# Patient Record
Sex: FEMALE | Race: WHITE | NOT HISPANIC OR LATINO
[De-identification: names, ages, dates, MRNs, and addresses within clinical notes are randomized per-mention and may not be internally consistent; named-entity substitution may affect disease eponyms.]

---

## 2016-12-15 RX ORDER — SODIUM CHLORIDE 9 MG/ML
3 INJECTION INTRAMUSCULAR; INTRAVENOUS; SUBCUTANEOUS EVERY 8 HOURS
Qty: 0 | Refills: 0 | Status: DISCONTINUED | OUTPATIENT
Start: 2017-01-04 | End: 2017-01-04

## 2016-12-20 RX ORDER — GABAPENTIN 400 MG/1
300 CAPSULE ORAL ONCE
Qty: 0 | Refills: 0 | Status: COMPLETED | OUTPATIENT
Start: 2017-01-04 | End: 2017-01-04

## 2016-12-20 RX ORDER — TRANEXAMIC ACID 100 MG/ML
600 INJECTION, SOLUTION INTRAVENOUS ONCE
Qty: 0 | Refills: 0 | Status: DISCONTINUED | OUTPATIENT
Start: 2017-01-04 | End: 2017-01-04

## 2016-12-20 RX ORDER — CELECOXIB 200 MG/1
400 CAPSULE ORAL ONCE
Qty: 0 | Refills: 0 | Status: COMPLETED | OUTPATIENT
Start: 2017-01-04 | End: 2017-01-04

## 2016-12-20 RX ORDER — ACETAMINOPHEN 500 MG
1000 TABLET ORAL ONCE
Qty: 0 | Refills: 0 | Status: DISCONTINUED | OUTPATIENT
Start: 2017-01-04 | End: 2017-01-04

## 2016-12-20 RX ORDER — SCOPALAMINE 1 MG/3D
1.5 PATCH, EXTENDED RELEASE TRANSDERMAL ONCE
Qty: 0 | Refills: 0 | Status: COMPLETED | OUTPATIENT
Start: 2017-01-04 | End: 2017-01-04

## 2016-12-20 RX ORDER — CEFAZOLIN SODIUM 1 G
2000 VIAL (EA) INJECTION ONCE
Qty: 0 | Refills: 0 | Status: DISCONTINUED | OUTPATIENT
Start: 2017-01-04 | End: 2017-01-04

## 2016-12-20 RX ORDER — OXYCODONE HYDROCHLORIDE 5 MG/1
10 TABLET ORAL ONCE
Qty: 0 | Refills: 0 | Status: DISCONTINUED | OUTPATIENT
Start: 2017-01-04 | End: 2017-01-04

## 2017-01-03 ENCOUNTER — RESULT REVIEW (OUTPATIENT)
Age: 82
End: 2017-01-03

## 2017-01-04 ENCOUNTER — TRANSCRIPTION ENCOUNTER (OUTPATIENT)
Age: 82
End: 2017-01-04

## 2017-01-04 ENCOUNTER — INPATIENT (INPATIENT)
Facility: HOSPITAL | Age: 82
LOS: 0 days | Discharge: HOME | DRG: 470 | End: 2017-01-05
Attending: ORTHOPAEDIC SURGERY | Admitting: ORTHOPAEDIC SURGERY
Payer: MEDICARE

## 2017-01-04 ENCOUNTER — APPOINTMENT (OUTPATIENT)
Dept: ORTHOPEDIC SURGERY | Facility: HOSPITAL | Age: 82
End: 2017-01-04

## 2017-01-04 VITALS
SYSTOLIC BLOOD PRESSURE: 145 MMHG | WEIGHT: 136.69 LBS | RESPIRATION RATE: 16 BRPM | HEIGHT: 66 IN | OXYGEN SATURATION: 99 % | HEART RATE: 57 BPM | DIASTOLIC BLOOD PRESSURE: 53 MMHG | TEMPERATURE: 98 F

## 2017-01-04 DIAGNOSIS — G47.30 SLEEP APNEA, UNSPECIFIED: ICD-10-CM

## 2017-01-04 DIAGNOSIS — Z98.49 CATARACT EXTRACTION STATUS, UNSPECIFIED EYE: Chronic | ICD-10-CM

## 2017-01-04 DIAGNOSIS — Z96.652 PRESENCE OF LEFT ARTIFICIAL KNEE JOINT: ICD-10-CM

## 2017-01-04 DIAGNOSIS — M17.12 UNILATERAL PRIMARY OSTEOARTHRITIS, LEFT KNEE: ICD-10-CM

## 2017-01-04 DIAGNOSIS — R00.1 BRADYCARDIA, UNSPECIFIED: ICD-10-CM

## 2017-01-04 PROCEDURE — 88311 DECALCIFY TISSUE: CPT | Mod: 26

## 2017-01-04 PROCEDURE — 27447 TOTAL KNEE ARTHROPLASTY: CPT | Mod: AS,LT

## 2017-01-04 PROCEDURE — 73560 X-RAY EXAM OF KNEE 1 OR 2: CPT | Mod: 26,LT

## 2017-01-04 PROCEDURE — 99223 1ST HOSP IP/OBS HIGH 75: CPT

## 2017-01-04 PROCEDURE — 27447 TOTAL KNEE ARTHROPLASTY: CPT | Mod: LT

## 2017-01-04 PROCEDURE — 88305 TISSUE EXAM BY PATHOLOGIST: CPT | Mod: 26

## 2017-01-04 RX ORDER — DOCUSATE SODIUM 100 MG
100 CAPSULE ORAL THREE TIMES A DAY
Qty: 0 | Refills: 0 | Status: DISCONTINUED | OUTPATIENT
Start: 2017-01-04 | End: 2017-01-05

## 2017-01-04 RX ORDER — AMLODIPINE BESYLATE 2.5 MG/1
5 TABLET ORAL DAILY
Qty: 0 | Refills: 0 | Status: DISCONTINUED | OUTPATIENT
Start: 2017-01-05 | End: 2017-01-05

## 2017-01-04 RX ORDER — HYDROMORPHONE HYDROCHLORIDE 2 MG/ML
2 INJECTION INTRAMUSCULAR; INTRAVENOUS; SUBCUTANEOUS
Qty: 0 | Refills: 0 | Status: DISCONTINUED | OUTPATIENT
Start: 2017-01-04 | End: 2017-01-05

## 2017-01-04 RX ORDER — ONDANSETRON 8 MG/1
4 TABLET, FILM COATED ORAL EVERY 4 HOURS
Qty: 0 | Refills: 0 | Status: DISCONTINUED | OUTPATIENT
Start: 2017-01-04 | End: 2017-01-05

## 2017-01-04 RX ORDER — ACETAMINOPHEN 500 MG
1000 TABLET ORAL
Qty: 0 | Refills: 0 | Status: COMPLETED | OUTPATIENT
Start: 2017-01-04 | End: 2017-01-04

## 2017-01-04 RX ORDER — SODIUM CHLORIDE 9 MG/ML
1000 INJECTION INTRAMUSCULAR; INTRAVENOUS; SUBCUTANEOUS
Qty: 0 | Refills: 0 | Status: DISCONTINUED | OUTPATIENT
Start: 2017-01-04 | End: 2017-01-05

## 2017-01-04 RX ORDER — SODIUM CHLORIDE 9 MG/ML
1000 INJECTION, SOLUTION INTRAVENOUS
Qty: 0 | Refills: 0 | Status: DISCONTINUED | OUTPATIENT
Start: 2017-01-04 | End: 2017-01-04

## 2017-01-04 RX ORDER — ASPIRIN/CALCIUM CARB/MAGNESIUM 324 MG
325 TABLET ORAL
Qty: 0 | Refills: 0 | Status: DISCONTINUED | OUTPATIENT
Start: 2017-01-05 | End: 2017-01-05

## 2017-01-04 RX ORDER — OXYCODONE HYDROCHLORIDE 5 MG/1
10 TABLET ORAL EVERY 12 HOURS
Qty: 0 | Refills: 0 | Status: DISCONTINUED | OUTPATIENT
Start: 2017-01-04 | End: 2017-01-05

## 2017-01-04 RX ORDER — ONDANSETRON 8 MG/1
4 TABLET, FILM COATED ORAL ONCE
Qty: 0 | Refills: 0 | Status: DISCONTINUED | OUTPATIENT
Start: 2017-01-04 | End: 2017-01-04

## 2017-01-04 RX ORDER — ATENOLOL 25 MG/1
25 TABLET ORAL DAILY
Qty: 0 | Refills: 0 | Status: DISCONTINUED | OUTPATIENT
Start: 2017-01-05 | End: 2017-01-05

## 2017-01-04 RX ORDER — VANCOMYCIN HCL 1 G
1000 VIAL (EA) INTRAVENOUS ONCE
Qty: 0 | Refills: 0 | Status: COMPLETED | OUTPATIENT
Start: 2017-01-04 | End: 2017-01-04

## 2017-01-04 RX ORDER — VANCOMYCIN HCL 1 G
1000 VIAL (EA) INTRAVENOUS
Qty: 0 | Refills: 0 | Status: COMPLETED | OUTPATIENT
Start: 2017-01-04 | End: 2017-01-04

## 2017-01-04 RX ORDER — HYDROMORPHONE HYDROCHLORIDE 2 MG/ML
0.5 INJECTION INTRAMUSCULAR; INTRAVENOUS; SUBCUTANEOUS
Qty: 0 | Refills: 0 | Status: DISCONTINUED | OUTPATIENT
Start: 2017-01-04 | End: 2017-01-05

## 2017-01-04 RX ORDER — KETOROLAC TROMETHAMINE 30 MG/ML
15 SYRINGE (ML) INJECTION EVERY 6 HOURS
Qty: 0 | Refills: 0 | Status: DISCONTINUED | OUTPATIENT
Start: 2017-01-04 | End: 2017-01-05

## 2017-01-04 RX ORDER — SENNA PLUS 8.6 MG/1
2 TABLET ORAL AT BEDTIME
Qty: 0 | Refills: 0 | Status: DISCONTINUED | OUTPATIENT
Start: 2017-01-04 | End: 2017-01-05

## 2017-01-04 RX ORDER — CEFAZOLIN SODIUM 1 G
2000 VIAL (EA) INJECTION
Qty: 0 | Refills: 0 | Status: COMPLETED | OUTPATIENT
Start: 2017-01-04 | End: 2017-01-04

## 2017-01-04 RX ORDER — FENTANYL CITRATE 50 UG/ML
25 INJECTION INTRAVENOUS
Qty: 0 | Refills: 0 | Status: DISCONTINUED | OUTPATIENT
Start: 2017-01-04 | End: 2017-01-04

## 2017-01-04 RX ORDER — ACETAMINOPHEN 500 MG
650 TABLET ORAL EVERY 6 HOURS
Qty: 0 | Refills: 0 | Status: DISCONTINUED | OUTPATIENT
Start: 2017-01-04 | End: 2017-01-05

## 2017-01-04 RX ORDER — MAGNESIUM HYDROXIDE 400 MG/1
30 TABLET, CHEWABLE ORAL DAILY
Qty: 0 | Refills: 0 | Status: DISCONTINUED | OUTPATIENT
Start: 2017-01-04 | End: 2017-01-05

## 2017-01-04 RX ORDER — OXYCODONE HYDROCHLORIDE 5 MG/1
5 TABLET ORAL
Qty: 0 | Refills: 0 | Status: DISCONTINUED | OUTPATIENT
Start: 2017-01-04 | End: 2017-01-05

## 2017-01-04 RX ORDER — OXYCODONE HYDROCHLORIDE 5 MG/1
10 TABLET ORAL
Qty: 0 | Refills: 0 | Status: DISCONTINUED | OUTPATIENT
Start: 2017-01-04 | End: 2017-01-05

## 2017-01-04 RX ADMIN — Medication 250 MILLIGRAM(S): at 09:25

## 2017-01-04 RX ADMIN — SCOPALAMINE 1.5 MILLIGRAM(S): 1 PATCH, EXTENDED RELEASE TRANSDERMAL at 07:43

## 2017-01-04 RX ADMIN — Medication 100 MILLIGRAM(S): at 17:00

## 2017-01-04 RX ADMIN — Medication 1000 MILLIGRAM(S): at 21:09

## 2017-01-04 RX ADMIN — Medication 250 MILLIGRAM(S): at 22:11

## 2017-01-04 RX ADMIN — CELECOXIB 400 MILLIGRAM(S): 200 CAPSULE ORAL at 07:42

## 2017-01-04 RX ADMIN — OXYCODONE HYDROCHLORIDE 10 MILLIGRAM(S): 5 TABLET ORAL at 07:42

## 2017-01-04 RX ADMIN — Medication 100 MILLIGRAM(S): at 22:11

## 2017-01-04 RX ADMIN — GABAPENTIN 300 MILLIGRAM(S): 400 CAPSULE ORAL at 07:43

## 2017-01-04 RX ADMIN — Medication 400 MILLIGRAM(S): at 20:18

## 2017-01-04 NOTE — PROGRESS NOTE ADULT - SUBJECTIVE AND OBJECTIVE BOX
Ortho Post Op Check    Name: VERÓNICA STEVENSON    MR #: [id number]    Procedure: Left Total Knee Arthroplasty  Surgeon: Kike Bates    Pt comfortable without complaints, pain controlled, Found laying in bed with friend at bedside  Denies CP, SOB, N/V, numbness/tingling     General Exam:  Vital Signs Last 24 Hrs  T(C): 36.4, Max: 36.4 (01-04 @ 18:00)  T(F): 97.5, Max: 97.5 (01-04 @ 18:00)  HR: 62 (56 - 62)  BP: 100/56 (100/56 - 115/46)  BP(mean): --  RR: 13 (13 - 20)  SpO2: 96% (96% - 97%)  Wt(kg): --    General: Pt Alert and oriented, NAD, controlled pain.  Dressings C/D/I. No bleeding.  Pulses: 2+ dorsalis pedis pulse. Cap refill < 2 sec.  Sensation: Grossly intact to light touch without deficit.  Motor: + EHL/FHL/TA/GS    Post-op X-Ray: Reviewed      A/P: 81yFemale POD#0 s/p   - Stable  - Pain Control  - DVT ppx: Aspirin 325mg Twice daily  - Post op abx: Ancef, Vancomycin  - PT continue present care  - Weight bearing status: WBAT

## 2017-01-04 NOTE — DISCHARGE NOTE ADULT - PLAN OF CARE
Decrease Pain, Improve ambulation and activities of daily living The patient will be seen in the office between 2-3 weeks for wound check. Patient may shower after post-op day #5. The dressing is to be removed on day # 10 (ten). The patient will contact the office if the wound becomes red, has increasing pain, develops bleeding or discharge, an injury occurs, or has other concerns. The patient will continue PT consistent with total knee replacement. The patient will continue Aspirin 325mg twice daily for 6 weeks for DVTP. The patient will take Oxycodone for pain control and titrate according to prescription and patient needs. The patient is FULL weight bearing. Elevation of the lower leg is recommended to reduce swelling. The patient will be seen in the office between 2-3 weeks for wound check. Patient may shower after post-op day #5. The dressing is to be removed on day # 10 (ten). The patient will contact the office if the wound becomes red, has increasing pain, develops bleeding or discharge, an injury occurs, or has other concerns. The patient will continue PT consistent with total knee replacement. The patient will continue Aspirin 325mg twice daily for 6 weeks for DVTP. The patient will take Tramadol for pain control and titrate according to prescription and patient needs. The patient is FULL weight bearing. Elevation of the lower leg is recommended to reduce swelling.

## 2017-01-04 NOTE — DISCHARGE NOTE ADULT - NS AS ACTIVITY OBS
Walking-Indoors allowed/Stairs allowed/No Heavy lifting/straining/Showering allowed/Walking-Outdoors allowed

## 2017-01-04 NOTE — CONSULT NOTE ADULT - ASSESSMENT
This is a 81 y.o female who presents to PST today.  The pt reports a history of chronic left knee pain most likely sustained from being an avid skier. S/P L TKA # 0 This is a 81 y.o female with  history of chronic left knee pain most likely sustained from being an avid skier. S/P L TKA # 0

## 2017-01-04 NOTE — CONSULT NOTE ADULT - PROBLEM SELECTOR RECOMMENDATION 2
PT/OT/pain mgmt  DVT prophylaxis- as per ortho  Abx as per SCIP  Incentive spirometry  Prophylaxis of opoid induced constipation

## 2017-01-04 NOTE — DISCHARGE NOTE ADULT - CARE PLAN
Principal Discharge DX:	Status post left knee replacement  Goal:	Decrease Pain, Improve ambulation and activities of daily living  Instructions for follow-up, activity and diet:	The patient will be seen in the office between 2-3 weeks for wound check. Patient may shower after post-op day #5. The dressing is to be removed on day # 10 (ten). The patient will contact the office if the wound becomes red, has increasing pain, develops bleeding or discharge, an injury occurs, or has other concerns. The patient will continue PT consistent with total knee replacement. The patient will continue Aspirin 325mg twice daily for 6 weeks for DVTP. The patient will take Oxycodone for pain control and titrate according to prescription and patient needs. The patient is FULL weight bearing. Elevation of the lower leg is recommended to reduce swelling. Principal Discharge DX:	Status post left knee replacement  Goal:	Decrease Pain, Improve ambulation and activities of daily living  Instructions for follow-up, activity and diet:	The patient will be seen in the office between 2-3 weeks for wound check. Patient may shower after post-op day #5. The dressing is to be removed on day # 10 (ten). The patient will contact the office if the wound becomes red, has increasing pain, develops bleeding or discharge, an injury occurs, or has other concerns. The patient will continue PT consistent with total knee replacement. The patient will continue Aspirin 325mg twice daily for 6 weeks for DVTP. The patient will take Tramadol for pain control and titrate according to prescription and patient needs. The patient is FULL weight bearing. Elevation of the lower leg is recommended to reduce swelling.

## 2017-01-04 NOTE — DISCHARGE NOTE ADULT - CARE PROVIDER_API CALL
Kike Bates), Orthopaedic Surgery  [address] [place] [id number]  Phone: [phone]  Fax: [phone]

## 2017-01-04 NOTE — DISCHARGE NOTE ADULT - HOSPITAL COURSE
The patient underwent a LEFT TOTAL KNEE REPLACEMENT on 1/4/2017. The patient received antibiotics consistent with SCIP guidelines. The patient underwent the procedure and had no intra-operative complications. Post-operatively, the patient was seen by medicine and PT. The patient received Aspirin for DVTP. The patient received pain medications per orthopedic pain management protocol and the pain was appropriately controlled. The patient did not have any post-operative medical complications. The patient was discharged in stable condition.

## 2017-01-04 NOTE — PHYSICAL THERAPY INITIAL EVALUATION ADULT - ACTIVE RANGE OF MOTION EXAMINATION, REHAB EVAL
bilateral  lower extremity Active ROM was WFL (within functional limits)/deficits as listed below/L knee flexion to 70deg./bilateral upper extremity Active ROM was WFL (within functional limits)

## 2017-01-04 NOTE — CONSULT NOTE ADULT - SUBJECTIVE AND OBJECTIVE BOX
PMD : Shant   Cardio :     Patient is a 81y old  Female s/p L TKA , POD # 0  CC: L knee pain    HPI:  This is a 81 y.o female who presents to PST today.  The pt reports a history of chronic left knee pain most likely sustained from being an avid skier. S/P L TKA # 0      PAST MEDICAL & SURGICAL HISTORY:  Borderline diabetes mellitus  Sleep apnea  H/O: hypertension  Hyperlipemia  History of cataract surgery  History of D&C      Social History:  Tabacco -   ETOH -   Illicit drug abuse - denies    FAMILY HISTORY:  Family history of hypertension (Mother)  Family history of acute myocardial infarction (Mother)  Family history of lung cancer (Father)      Allergies    codeine (Nausea; Vomiting)  Demerol HCl (Nausea; Vomiting)    HOME MEDICATIONS :     · 	atenolol 25 mg oral tablet: Last Dose Taken:  , 1 tab(s) orally once a day  · 	amLODIPine 5 mg oral tablet: Last Dose Taken:  , 1 tab(s) orally once a day  · 	eye vitamins: Last Dose Taken:  , 1 cap(s)  once a day  · 	calcium: Last Dose Taken:  , 1000 milligram(s)  once a day  · 	alph lopoic acid: Last Dose Taken:  , 300 milligram(s) orally once a day    REVIEW OF SYSTEMS:    CONSTITUTIONAL: No fever, weight loss, or fatigue  EYES: No eye pain, visual disturbances, or discharge  NECK: No pain or stiffness  RESPIRATORY: No cough, wheezing, chills or hemoptysis; No shortness of breath  CARDIOVASCULAR: No chest pain, palpitations, dizziness, or leg swelling  GASTROINTESTINAL: No abdominal or epigastric pain. No nausea, vomiting, or hematemesis; No diarrhea or constipation. No melena or hematochezia.  GENITOURINARY: No dysuria, frequency, hematuria, or incontinence  NEUROLOGICAL: No headaches, memory loss, loss of strength, numbness, or tremors  SKIN: No itching, burning, rashes, or lesions   LYMPH NODES: No enlarged glands  ENDOCRINE: No heat or cold intolerance; No hair loss  MUSCULOSKELETAL: L knee pain  PSYCHIATRIC: No depression, anxiety, mood swings, or difficulty sleeping  HEME/LYMPH: No easy bruising, or bleeding gums  ALLERGY AND IMMUNOLOGIC: No hives or eczema    MEDICATIONS  (STANDING):    MEDICATIONS  (PRN):      Vital Signs Last 24 Hrs  T(C): 36.7, Max: 36.7 (01-04 @ 07:34)  T(F): 98.1, Max: 98.1 (01-04 @ 07:34)  HR: 57 (57 - 57)  BP: 145/53 (145/53 - 145/53)  BP(mean): --  RR: 16 (16 - 16)  SpO2: 99% (99% - 99%)    PHYSICAL EXAM:    GENERAL: NAD, well-groomed, well-developed  HEAD:  Atraumatic, Normocephalic  EYES: EOMI, PERRLA, conjunctiva and sclera clear  NECK: Supple, No JVD, Normal thyroid  NERVOUS SYSTEM:  Alert & Oriented X3, Good concentration; Motor Strength 5/5 B/L upper and lower extremities; DTRs 2+ intact and symmetric  CHEST/LUNG: CTA  b/l,  no rales, rhonchi, wheezing, or rubs  HEART: Regular rate and rhythm; No murmurs, rubs, or gallops  ABDOMEN: Soft, Nontender, Nondistended; Bowel sounds present  EXTREMITIES:  2+ Peripheral Pulses, No clubbing, cyanosis, or edema ,   LYMPH: No lymphadenopathy noted  SKIN: No rashes or lesions    LABS:- pending    RADIOLOGY & ADDITIONAL STUDIES: PMD : Shant   Cardio : none    Patient is a 81y old  Female s/p L TKA , POD # 0  CC: L knee pain    HPI:  This is a 81 y.o female,  history of chronic left knee pain most likely sustained from being an avid skier. S/P L TKA # 0      PAST MEDICAL & SURGICAL HISTORY:  Borderline diabetes mellitus  Sleep apnea- not on CPAP , uses oral appliance  H/O: hypertension  Hyperlipemia  History of cataract surgery  History of D&C      Social History:  Tabacco - ex - smoker , quit > 40 yrs  ETOH - socially   Illicit drug abuse - denies    FAMILY HISTORY:  Family history of hypertension (Mother)  Family history of acute myocardial infarction (Mother)  Family history of lung cancer (Father)      Allergies    codeine (Nausea; Vomiting)  Demerol HCl (Nausea; Vomiting)    HOME MEDICATIONS :     · 	atenolol 25 mg oral tablet: Last Dose Taken:  , 1 tab(s) orally once a day  · 	amLODIPine 5 mg oral tablet: Last Dose Taken:  , 1 tab(s) orally once a day  · 	eye vitamins: Last Dose Taken:  , 1 cap(s)  once a day  · 	calcium: Last Dose Taken:  , 1000 milligram(s)  once a day  · 	alph lopoic acid: Last Dose Taken:  , 300 milligram(s) orally once a day    REVIEW OF SYSTEMS:    CONSTITUTIONAL: No fever, weight loss, or fatigue  EYES: No eye pain, visual disturbances, or discharge  NECK: No pain or stiffness  RESPIRATORY: No cough, wheezing, chills or hemoptysis; No shortness of breath  CARDIOVASCULAR: No chest pain, palpitations, dizziness, or leg swelling  GASTROINTESTINAL: No abdominal or epigastric pain. No nausea, vomiting, or hematemesis; No diarrhea or constipation. No melena or hematochezia.  GENITOURINARY: No dysuria, frequency, hematuria, or incontinence  NEUROLOGICAL: No headaches, memory loss, loss of strength, numbness, or tremors  SKIN: No itching, burning, rashes, or lesions   LYMPH NODES: No enlarged glands  ENDOCRINE: No heat or cold intolerance; No hair loss  MUSCULOSKELETAL: L knee pain  PSYCHIATRIC: No depression, anxiety, mood swings, or difficulty sleeping  HEME/LYMPH: No easy bruising, or bleeding gums  ALLERGY AND IMMUNOLOGIC: No hives or eczema    MEDICATIONS  (STANDING):    MEDICATIONS  (PRN):      Vital Signs Last 24 Hrs  T(C): 36.7, Max: 36.7 (01-04 @ 07:34)  T(F): 98.1, Max: 98.1 (01-04 @ 07:34)  HR: 57 (57 - 57)  BP: 145/53 (145/53 - 145/53)  BP(mean): --  RR: 16 (16 - 16)  SpO2: 99% (99% - 99%)    PHYSICAL EXAM:    GENERAL: NAD, well-groomed, well-developed  HEAD:  Atraumatic, Normocephalic  EYES: EOMI, PERRLA, conjunctiva and sclera clear  NECK: Supple, No JVD, Normal thyroid  NERVOUS SYSTEM:  Alert & Oriented X3, Good concentration;  CHEST/LUNG: CTA  b/l,  no rales, rhonchi, wheezing, or rubs  HEART: Regular rate , bradycardic; No murmurs, rubs, or gallops  ABDOMEN: Soft, Nontender, Nondistended; Bowel sounds present  EXTREMITIES:  2+ Peripheral Pulses, No clubbing, cyanosis, or edema , L knee dressing + , C/D/I  LYMPH: No lymphadenopathy noted  SKIN: No rashes or lesions    LABS:- pending    RADIOLOGY & ADDITIONAL STUDIES:     ekg ( 12/15/16 )    iagnosis Line Sinus bradycardia  DOtherwise normal ECG - reviewed by me

## 2017-01-04 NOTE — DISCHARGE NOTE ADULT - NS MD DC FALL RISK RISK
For information on Fall & Injury Prevention, visit [email or web address]

## 2017-01-04 NOTE — DISCHARGE NOTE ADULT - MEDICATION SUMMARY - MEDICATIONS TO TAKE
I will START or STAY ON the medications listed below when I get home from the hospital:    eye vitamins  -- 1 cap(s)  once a day  -- Indication: For Home med    calcium  -- 1000 milligram(s)  once a day  -- Indication: For Home med    alph lopoic acid  -- 300 milligram(s) by mouth once a day  -- Indication: For Home med    aspirin 325 mg oral tablet  -- 1 tab(s) by mouth 2 times a day x 6 weeks  -- Indication: For DVTP    traMADol 50 mg oral tablet  -- 1 tab(s) by mouth every 4 to 6 hours, As needed MDD:6  -- Indication: For Pain    atenolol 25 mg oral tablet  -- 1 tab(s) by mouth once a day  -- Indication: For Home med    amLODIPine 5 mg oral tablet  -- 1 tab(s) by mouth once a day  -- Indication: For Home med    docusate sodium 100 mg oral capsule  -- 1 cap(s) by mouth 3 times a day  -- Indication: For constipation

## 2017-01-04 NOTE — DISCHARGE NOTE ADULT - CARE PROVIDERS DIRECT ADDRESSES
[email or web address]

## 2017-01-04 NOTE — DISCHARGE NOTE ADULT - PATIENT PORTAL LINK FT
“You can access the Follow[facility] Patient Portal, offered by [facility], by registering with the following website: [email or web address]”

## 2017-01-04 NOTE — PHYSICAL THERAPY INITIAL EVALUATION ADULT - ADDITIONAL COMMENTS
Pt lives in a co-op on ground level, no stairs. Her sister is here to stay with her for as long as she needs so she will not be alone. Pt owns a RW but did not use it.

## 2017-01-05 VITALS
RESPIRATION RATE: 15 BRPM | DIASTOLIC BLOOD PRESSURE: 52 MMHG | SYSTOLIC BLOOD PRESSURE: 107 MMHG | HEART RATE: 54 BPM | OXYGEN SATURATION: 97 % | TEMPERATURE: 98 F

## 2017-01-05 LAB
ANION GAP SERPL CALC-SCNC: 9 MMOL/L — SIGNIFICANT CHANGE UP (ref 5–17)
BUN SERPL-MCNC: 14 MG/DL — SIGNIFICANT CHANGE UP (ref 8–20)
CALCIUM SERPL-MCNC: 8.5 MG/DL — LOW (ref 8.6–10.2)
CHLORIDE SERPL-SCNC: 106 MMOL/L — SIGNIFICANT CHANGE UP (ref 98–107)
CO2 SERPL-SCNC: 27 MMOL/L — SIGNIFICANT CHANGE UP (ref 22–29)
CREAT SERPL-MCNC: 0.51 MG/DL — SIGNIFICANT CHANGE UP (ref 0.5–1.3)
GLUCOSE SERPL-MCNC: 94 MG/DL — SIGNIFICANT CHANGE UP (ref 70–115)
HCT VFR BLD CALC: 36.3 % — LOW (ref 37–47)
HGB BLD-MCNC: 11.9 G/DL — LOW (ref 12–16)
MCHC RBC-ENTMCNC: 30.2 PG — SIGNIFICANT CHANGE UP (ref 27–31)
MCHC RBC-ENTMCNC: 32.8 G/DL — SIGNIFICANT CHANGE UP (ref 32–36)
MCV RBC AUTO: 92.1 FL — SIGNIFICANT CHANGE UP (ref 81–99)
PLATELET # BLD AUTO: 191 K/UL — SIGNIFICANT CHANGE UP (ref 150–400)
POTASSIUM SERPL-MCNC: 4 MMOL/L — SIGNIFICANT CHANGE UP (ref 3.5–5.3)
POTASSIUM SERPL-SCNC: 4 MMOL/L — SIGNIFICANT CHANGE UP (ref 3.5–5.3)
RBC # BLD: 3.94 M/UL — LOW (ref 4.4–5.2)
RBC # FLD: 12.7 % — SIGNIFICANT CHANGE UP (ref 11–15.6)
SODIUM SERPL-SCNC: 142 MMOL/L — SIGNIFICANT CHANGE UP (ref 135–145)
WBC # BLD: 7.49 K/UL — SIGNIFICANT CHANGE UP (ref 4.8–10.8)
WBC # FLD AUTO: 7.49 K/UL — SIGNIFICANT CHANGE UP (ref 4.8–10.8)

## 2017-01-05 PROCEDURE — 97116 GAIT TRAINING THERAPY: CPT

## 2017-01-05 PROCEDURE — 80048 BASIC METABOLIC PNL TOTAL CA: CPT

## 2017-01-05 PROCEDURE — 97167 OT EVAL HIGH COMPLEX 60 MIN: CPT

## 2017-01-05 PROCEDURE — 97110 THERAPEUTIC EXERCISES: CPT

## 2017-01-05 PROCEDURE — 36415 COLL VENOUS BLD VENIPUNCTURE: CPT

## 2017-01-05 PROCEDURE — 99232 SBSQ HOSP IP/OBS MODERATE 35: CPT

## 2017-01-05 PROCEDURE — 88311 DECALCIFY TISSUE: CPT

## 2017-01-05 PROCEDURE — 88305 TISSUE EXAM BY PATHOLOGIST: CPT

## 2017-01-05 PROCEDURE — 85027 COMPLETE CBC AUTOMATED: CPT

## 2017-01-05 PROCEDURE — 97530 THERAPEUTIC ACTIVITIES: CPT

## 2017-01-05 PROCEDURE — C1776: CPT

## 2017-01-05 PROCEDURE — C1713: CPT

## 2017-01-05 PROCEDURE — 73560 X-RAY EXAM OF KNEE 1 OR 2: CPT

## 2017-01-05 PROCEDURE — 97163 PT EVAL HIGH COMPLEX 45 MIN: CPT

## 2017-01-05 RX ORDER — TRAMADOL HYDROCHLORIDE 50 MG/1
50 TABLET ORAL EVERY 4 HOURS
Qty: 0 | Refills: 0 | Status: DISCONTINUED | OUTPATIENT
Start: 2017-01-05 | End: 2017-01-05

## 2017-01-05 RX ORDER — TRAMADOL HYDROCHLORIDE 50 MG/1
1 TABLET ORAL
Qty: 42 | Refills: 0
Start: 2017-01-05

## 2017-01-05 RX ORDER — TRAMADOL HYDROCHLORIDE 50 MG/1
1 TABLET ORAL
Qty: 0 | Refills: 0 | COMMUNITY
Start: 2017-01-05

## 2017-01-05 RX ORDER — DOCUSATE SODIUM 100 MG
1 CAPSULE ORAL
Qty: 30 | Refills: 0
Start: 2017-01-05

## 2017-01-05 RX ORDER — ASPIRIN/CALCIUM CARB/MAGNESIUM 324 MG
1 TABLET ORAL
Qty: 90 | Refills: 0
Start: 2017-01-05

## 2017-01-05 RX ADMIN — Medication 15 MILLIGRAM(S): at 14:30

## 2017-01-05 RX ADMIN — Medication 15 MILLIGRAM(S): at 06:22

## 2017-01-05 RX ADMIN — Medication 325 MILLIGRAM(S): at 06:22

## 2017-01-05 RX ADMIN — Medication 650 MILLIGRAM(S): at 06:22

## 2017-01-05 RX ADMIN — Medication 15 MILLIGRAM(S): at 07:07

## 2017-01-05 RX ADMIN — Medication 100 MILLIGRAM(S): at 06:22

## 2017-01-05 RX ADMIN — HYDROMORPHONE HYDROCHLORIDE 2 MILLIGRAM(S): 2 INJECTION INTRAMUSCULAR; INTRAVENOUS; SUBCUTANEOUS at 11:56

## 2017-01-05 RX ADMIN — Medication 15 MILLIGRAM(S): at 13:41

## 2017-01-05 RX ADMIN — ATENOLOL 25 MILLIGRAM(S): 25 TABLET ORAL at 06:22

## 2017-01-05 RX ADMIN — Medication 650 MILLIGRAM(S): at 13:41

## 2017-01-05 RX ADMIN — Medication 100 MILLIGRAM(S): at 13:43

## 2017-01-05 RX ADMIN — HYDROMORPHONE HYDROCHLORIDE 2 MILLIGRAM(S): 2 INJECTION INTRAMUSCULAR; INTRAVENOUS; SUBCUTANEOUS at 12:30

## 2017-01-05 NOTE — PROGRESS NOTE ADULT - SUBJECTIVE AND OBJECTIVE BOX
Patient seen and examined at bedside. Comfortable in bed. Pain controlled. Admits to participating with PT yesterday, states feels ready to go home today. Denies SOB/chest pain, abdominal pain, numbness/tingling. no complaints.    Vital Signs Last 24 Hrs  T(C): 36.6, Max: 37 (01-04 @ 12:37)  T(F): 97.8, Max: 98.6 (01-04 @ 12:37)  HR: 58 (46 - 64)  BP: 104/50 (90/45 - 145/53)  BP(mean): --  RR: 16 (11 - 20)  SpO2: 97% (94% - 100%)    LLE: Dressing C/D/I. Stockings noted, in place B/L. sensation in tact to light touch distally. DP 2+. +dorsi/plantarflexion. Able to wiggle toes. Calf soft, NT B/L                          11.9   7.49  )-----------( 191      ( 05 Jan 2017 05:14 )             36.3   05 Jan 2017 05:14    142    |  106    |  14.0   ----------------------------<  94     4.0     |  27.0   |  0.51     Ca    8.5        05 Jan 2017 05:14    A/P: 81 y.o F s/p left TKA POD #1  - WBAT  - DVTP (ASA 325mg BID x 6 weeks)  - pain control  - D/C planning - home today if cleared by PT/medicine

## 2017-01-05 NOTE — PROGRESS NOTE ADULT - ASSESSMENT
This is a 81 y.o female with  history of chronic left knee pain most likely sustained from being an avid skier. S/P L TKA # 1    Problem/Recommendation - 1:  Problem: Unilateral primary osteoarthritis, left knee. Recommendation: s/p L TKA.    Problem/Recommendation - 2:  ·  Problem: Status post left knee replacement.  Recommendation: PT/OT/pain mgmt  DVT prophylaxis- as per ortho  Abx as per SCIP  Incentive spirometry  Prophylaxis of opoid induced constipation.     Problem/Recommendation - 3:  ·  Problem: Borderline diabetes mellitus.  Recommendation: follow up with PMD.     Problem/Recommendation - 4:  ·  Problem: H/O: hypertension.  Recommendation: continue home meds with parameters.     Problem/Recommendation - 5:  ·  Problem: Sleep apnea, unspecified type.  Recommendation: not on CPAP , uses oral appliance , may use own , nocturnal .     Problem/Recommendation - 6:  Problem: Sinus bradycardia by electrocardiogram. Recommendation: Patient on BB , recent EKG with keila , asymptomatic , hold Atenolol if Hr < 55. This is a 81 y.o female with  history of chronic left knee pain most likely sustained from being an avid skier. S/P L TKA # 1    Problem/Recommendation - 1:  Problem: Unilateral primary osteoarthritis, left knee. Recommendation: s/p L TKA.    Problem/Recommendation - 2:  ·  Problem: Status post left knee replacement.  Recommendation: PT/OT/pain mgmt  DVT prophylaxis- as per ortho  Abx as per SCIP  Incentive spirometry  Prophylaxis of opoid induced constipation.     Problem/Recommendation - 3:  ·  Problem: Borderline diabetes mellitus.  Recommendation: follow up with PMD.     Problem/Recommendation - 4:  ·  Problem: H/O: hypertension.  Recommendation: continue home meds with parameters.     Problem/Recommendation - 5:  ·  Problem: Sleep apnea, unspecified type.  Recommendation: not on CPAP , uses oral appliance , may use own , nocturnal .     Problem/Recommendation - 6:  Problem: Sinus bradycardia by electrocardiogram. Recommendation: Patient on BB , recent EKG with keila , asymptomatic , hold Atenolol if Hr < 55.    Problem/Plan: ABLA- asymptomatic

## 2017-01-05 NOTE — PROGRESS NOTE ADULT - SUBJECTIVE AND OBJECTIVE BOX
Patient is a 81y old  Female s/p L TKA , POD # 1  CC: L knee pain    HPI:  This is a 81 y.o female,  history of chronic left knee pain most likely sustained from being an avid skier. S/P L TKA # 1      PAST MEDICAL & SURGICAL HISTORY:  Borderline diabetes mellitus  Sleep apnea  H/O: hypertension  Hyperlipemia  History of cataract surgery  History of D&amp;C      MEDICATIONS  (STANDING):  sodium chloride 0.9%. 1000milliLiter(s) IV Continuous <Continuous>  acetaminophen   Tablet 650milliGRAM(s) Oral every 6 hours  docusate sodium 100milliGRAM(s) Oral three times a day  aspirin 325milliGRAM(s) Oral two times a day  amLODIPine   Tablet 5milliGRAM(s) Oral daily  ATENolol  Tablet 25milliGRAM(s) Oral daily    MEDICATIONS  (PRN):  ketorolac   Injectable 15milliGRAM(s) IV Push every 6 hours PRN Moderate Pain (4 - 6)  HYDROmorphone  Injectable 0.5milliGRAM(s) IV Push every 3 hours PRN breakthrough pain control  aluminum hydroxide/magnesium hydroxide/simethicone Suspension 30milliLiter(s) Oral four times a day PRN Indigestion  ondansetron Injectable 4milliGRAM(s) IV Push every 4 hours PRN Nausea and/or Vomiting  magnesium hydroxide Suspension 30milliLiter(s) Oral daily PRN Constipation  senna 2Tablet(s) Oral at bedtime PRN Constipation  HYDROmorphone   Tablet 2milliGRAM(s) Oral every 3 hours PRN Severe Pain (7 - 10)  traMADol 50milliGRAM(s) Oral every 4 hours PRN mild-moderate pain      LABS:                          11.9   7.49  )-----------( 191      ( 05 Jan 2017 05:14 )             36.3     05 Jan 2017 05:14    142    |  106    |  14.0   ----------------------------<  94     4.0     |  27.0   |  0.51     Ca    8.5        05 Jan 2017 05:14            RADIOLOGY & ADDITIONAL TESTS:     EXAM:  KNEE-LEFT                          PROCEDURE DATE:  01/04/2017        INTERPRETATION:  HISTORY: Postoperative  knee replacement.    Two views of the left knee are submitted.    Evaluation demonstrates the presence of a tricompartmental knee  replacement with the femoral, tibial and patellar components in proper   anatomic alignment. There is no fracture .       Impression:  Knee prosthetic components in proper anatomical alignment.          REVIEW OF SYSTEMS:    CONSTITUTIONAL: No fever, weight loss, or fatigue  EYES: No eye pain, visual disturbances, or discharge  ENMT:  No difficulty hearing, tinnitus, vertigo; No sinus or throat pain  NECK: No pain or stiffness  RESPIRATORY: No cough, wheezing, chills or hemoptysis; No shortness of breath  CARDIOVASCULAR: No chest pain, palpitations, dizziness, or leg swelling  GASTROINTESTINAL: No abdominal or epigastric pain. No nausea, vomiting, or hematemesis; No diarrhea or constipation. No melena or hematochezia.  GENITOURINARY: No dysuria, frequency, hematuria, or incontinence  NEUROLOGICAL: No headaches, memory loss, loss of strength, numbness, or tremors  SKIN: No itching, burning, rashes, or lesions   LYMPH NODES: No enlarged glands  ENDOCRINE: No heat or cold intolerance; No hair loss  MUSCULOSKELETAL: L knee pain  PSYCHIATRIC: No depression, anxiety, mood swings, or difficulty sleeping  HEME/LYMPH: No easy bruising, or bleeding gums  ALLERGY AND IMMUNOLOGIC: No hives or eczema    Vital Signs Last 24 Hrs  T(C): 36.4, Max: 37 (01-04 @ 12:37)  T(F): 97.6, Max: 98.6 (01-04 @ 12:37)  HR: 54 (46 - 64)  BP: 107/52 (90/45 - 120/38)  BP(mean): --  RR: 15 (11 - 20)  SpO2: 97% (94% - 100%)  PHYSICAL EXAM:    GENERAL: NAD, well-groomed, well-developed  HEAD:  Atraumatic, Normocephalic  EYES: EOMI, PERRLA, conjunctiva and sclera clear  NECK: Supple, No JVD, Normal thyroid  NERVOUS SYSTEM:  Alert & Oriented X3, no focal deficit  CHEST/LUNG: CTA b/l ,  no  rales, rhonchi, wheezing, or rubs  HEART: Regular rate and rhythm; No murmurs, rubs, or gallops  ABDOMEN: Soft, Nontender, Nondistended; Bowel sounds present  EXTREMITIES:  2+ Peripheral Pulses, No clubbing, cyanosis, or edema , L knee dressing + , C/D/I  LYMPH: No lymphadenopathy noted  SKIN: No rashes or lesions Patient is a 81y old  Female s/p L TKA , POD # 1    CC: L knee pain , no other complaints thi am.    HPI:  This is a 81 y.o female,  history of chronic left knee pain most likely sustained from being an avid skier. S/P L TKA # 1      PAST MEDICAL & SURGICAL HISTORY:  Borderline diabetes mellitus  Sleep apnea  H/O: hypertension  Hyperlipemia  History of cataract surgery  History of D&amp;C      MEDICATIONS  (STANDING):  sodium chloride 0.9%. 1000milliLiter(s) IV Continuous <Continuous>  acetaminophen   Tablet 650milliGRAM(s) Oral every 6 hours  docusate sodium 100milliGRAM(s) Oral three times a day  aspirin 325milliGRAM(s) Oral two times a day  amLODIPine   Tablet 5milliGRAM(s) Oral daily  ATENolol  Tablet 25milliGRAM(s) Oral daily    MEDICATIONS  (PRN):  ketorolac   Injectable 15milliGRAM(s) IV Push every 6 hours PRN Moderate Pain (4 - 6)  HYDROmorphone  Injectable 0.5milliGRAM(s) IV Push every 3 hours PRN breakthrough pain control  aluminum hydroxide/magnesium hydroxide/simethicone Suspension 30milliLiter(s) Oral four times a day PRN Indigestion  ondansetron Injectable 4milliGRAM(s) IV Push every 4 hours PRN Nausea and/or Vomiting  magnesium hydroxide Suspension 30milliLiter(s) Oral daily PRN Constipation  senna 2Tablet(s) Oral at bedtime PRN Constipation  HYDROmorphone   Tablet 2milliGRAM(s) Oral every 3 hours PRN Severe Pain (7 - 10)  traMADol 50milliGRAM(s) Oral every 4 hours PRN mild-moderate pain      LABS:                          11.9   7.49  )-----------( 191      ( 05 Jan 2017 05:14 )             36.3     05 Jan 2017 05:14    142    |  106    |  14.0   ----------------------------<  94     4.0     |  27.0   |  0.51     Ca    8.5        05 Jan 2017 05:14            RADIOLOGY & ADDITIONAL TESTS:     EXAM:  KNEE-LEFT                          PROCEDURE DATE:  01/04/2017        INTERPRETATION:  HISTORY: Postoperative  knee replacement.    Two views of the left knee are submitted.    Evaluation demonstrates the presence of a tricompartmental knee  replacement with the femoral, tibial and patellar components in proper   anatomic alignment. There is no fracture .       Impression:  Knee prosthetic components in proper anatomical alignment.          REVIEW OF SYSTEMS:    CONSTITUTIONAL: No fever, weight loss, or fatigue  EYES: No eye pain, visual disturbances, or discharge  ENMT:  No difficulty hearing, tinnitus, vertigo; No sinus or throat pain  NECK: No pain or stiffness  RESPIRATORY: No cough, wheezing, chills or hemoptysis; No shortness of breath  CARDIOVASCULAR: No chest pain, palpitations, dizziness, or leg swelling  GASTROINTESTINAL: No abdominal or epigastric pain. No nausea, vomiting, or hematemesis; No diarrhea or constipation. No melena or hematochezia.  GENITOURINARY: No dysuria, frequency, hematuria, or incontinence  NEUROLOGICAL: No headaches, memory loss, loss of strength, numbness, or tremors  SKIN: No itching, burning, rashes, or lesions   LYMPH NODES: No enlarged glands  ENDOCRINE: No heat or cold intolerance; No hair loss  MUSCULOSKELETAL: L knee pain  PSYCHIATRIC: No depression, anxiety, mood swings, or difficulty sleeping  HEME/LYMPH: No easy bruising, or bleeding gums  ALLERGY AND IMMUNOLOGIC: No hives or eczema    Vital Signs Last 24 Hrs  T(C): 36.4, Max: 37 (01-04 @ 12:37)  T(F): 97.6, Max: 98.6 (01-04 @ 12:37)  HR: 54 (46 - 64)  BP: 107/52 (90/45 - 120/38)  BP(mean): --  RR: 15 (11 - 20)  SpO2: 97% (94% - 100%)  PHYSICAL EXAM:    GENERAL: NAD, well-groomed, well-developed  HEAD:  Atraumatic, Normocephalic  EYES: EOMI, PERRLA, conjunctiva and sclera clear  NECK: Supple, No JVD, Normal thyroid  NERVOUS SYSTEM:  Alert & Oriented X3, no focal deficit  CHEST/LUNG: CTA b/l ,  no  rales, rhonchi, wheezing, or rubs  HEART: Regular rate and rhythm; No murmurs, rubs, or gallops  ABDOMEN: Soft, Nontender, Nondistended; Bowel sounds present  EXTREMITIES:  2+ Peripheral Pulses, No clubbing, cyanosis, or edema , L knee dressing + , C/D/I  LYMPH: No lymphadenopathy noted  SKIN: No rashes or lesions

## 2017-01-09 LAB — SURGICAL PATHOLOGY FINAL REPORT - CH: SIGNIFICANT CHANGE UP

## 2017-01-11 ENCOUNTER — OTHER (OUTPATIENT)
Age: 82
End: 2017-01-11

## 2017-01-19 ENCOUNTER — OTHER (OUTPATIENT)
Age: 82
End: 2017-01-19

## 2017-01-19 ENCOUNTER — APPOINTMENT (OUTPATIENT)
Dept: ORTHOPEDIC SURGERY | Facility: CLINIC | Age: 82
End: 2017-01-19

## 2017-01-19 VITALS
DIASTOLIC BLOOD PRESSURE: 58 MMHG | WEIGHT: 140 LBS | HEIGHT: 68 IN | TEMPERATURE: 99 F | BODY MASS INDEX: 21.22 KG/M2 | SYSTOLIC BLOOD PRESSURE: 126 MMHG

## 2017-02-07 ENCOUNTER — OTHER (OUTPATIENT)
Age: 82
End: 2017-02-07

## 2017-02-15 ENCOUNTER — APPOINTMENT (OUTPATIENT)
Dept: ORTHOPEDIC SURGERY | Facility: CLINIC | Age: 82
End: 2017-02-15

## 2017-02-15 VITALS
TEMPERATURE: 97.1 F | DIASTOLIC BLOOD PRESSURE: 73 MMHG | BODY MASS INDEX: 21.22 KG/M2 | HEIGHT: 68 IN | WEIGHT: 140 LBS | SYSTOLIC BLOOD PRESSURE: 145 MMHG | HEART RATE: 76 BPM

## 2017-02-17 ENCOUNTER — OTHER (OUTPATIENT)
Age: 82
End: 2017-02-17

## 2017-03-06 ENCOUNTER — RX RENEWAL (OUTPATIENT)
Age: 82
End: 2017-03-06

## 2017-04-18 ENCOUNTER — OTHER (OUTPATIENT)
Age: 82
End: 2017-04-18

## 2017-04-25 ENCOUNTER — APPOINTMENT (OUTPATIENT)
Dept: ORTHOPEDIC SURGERY | Facility: CLINIC | Age: 82
End: 2017-04-25

## 2017-04-25 VITALS
BODY MASS INDEX: 21.22 KG/M2 | SYSTOLIC BLOOD PRESSURE: 132 MMHG | HEIGHT: 68 IN | WEIGHT: 140 LBS | HEART RATE: 50 BPM | DIASTOLIC BLOOD PRESSURE: 75 MMHG

## 2017-09-20 ENCOUNTER — OTHER (OUTPATIENT)
Age: 82
End: 2017-09-20

## 2018-01-30 ENCOUNTER — OTHER (OUTPATIENT)
Age: 83
End: 2018-01-30

## 2018-02-13 ENCOUNTER — APPOINTMENT (OUTPATIENT)
Dept: ORTHOPEDIC SURGERY | Facility: CLINIC | Age: 83
End: 2018-02-13
Payer: MEDICARE

## 2018-02-13 VITALS
HEIGHT: 68 IN | DIASTOLIC BLOOD PRESSURE: 68 MMHG | BODY MASS INDEX: 21.22 KG/M2 | HEART RATE: 50 BPM | SYSTOLIC BLOOD PRESSURE: 118 MMHG | WEIGHT: 140 LBS

## 2018-02-13 DIAGNOSIS — Z96.652 PRESENCE OF LEFT ARTIFICIAL KNEE JOINT: ICD-10-CM

## 2018-02-13 DIAGNOSIS — Z96.652 AFTERCARE FOLLOWING JOINT REPLACEMENT SURGERY: ICD-10-CM

## 2018-02-13 DIAGNOSIS — Z47.1 AFTERCARE FOLLOWING JOINT REPLACEMENT SURGERY: ICD-10-CM

## 2018-02-13 PROCEDURE — 73562 X-RAY EXAM OF KNEE 3: CPT | Mod: LT

## 2018-02-13 PROCEDURE — 99213 OFFICE O/P EST LOW 20 MIN: CPT

## 2019-07-23 ENCOUNTER — APPOINTMENT (OUTPATIENT)
Dept: ORTHOPEDIC SURGERY | Facility: CLINIC | Age: 84
End: 2019-07-23
Payer: MEDICARE

## 2019-07-23 VITALS
SYSTOLIC BLOOD PRESSURE: 153 MMHG | WEIGHT: 140 LBS | HEART RATE: 60 BPM | HEIGHT: 68 IN | DIASTOLIC BLOOD PRESSURE: 66 MMHG | BODY MASS INDEX: 21.22 KG/M2

## 2019-07-23 PROCEDURE — 99215 OFFICE O/P EST HI 40 MIN: CPT

## 2019-07-23 PROCEDURE — 73564 X-RAY EXAM KNEE 4 OR MORE: CPT

## 2019-07-23 NOTE — PHYSICAL EXAM
[de-identified] : The patient appears well nourished  and in no apparent distress.  The patient is alert and oriented to person, place, and time.   Affect and mood appear normal. The head is normocephalic and atraumatic.  The eyes reveal normal sclera and extra ocular muscles are intact. The tongue is midline with no apparent lesions.  Skin shows normal turgor with no evidence of eczema or psoriasis.  No respiratory distress noted.  Sensation grossly intact.\par   [de-identified] : Exam of the right knee shows a small effusion, 10 degrees of valgus which is partially correctable, MCL laxity, -3 to 120 degrees of flexion with pain. 5/5 motor strength bilaterally distally. Sensation intact distally.  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

## 2019-07-23 NOTE — HISTORY OF PRESENT ILLNESS
[de-identified] : This 83-year-old female presents for evaluation of her right knee. She reports a history of osteoarthritis of the right knee. Over the past 3 weeks she reports pain has been progressive. It is now constant. Pain is global throughout the knee with radiation down the lateral aspect of the calf. It is worse with any type of weightbearing activity. Ibuprofen and Tylenol have offered no significant relief.

## 2019-07-23 NOTE — DISCUSSION/SUMMARY
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 Conservative options were discussed. She is going on vacation in early September and was recommended to follow up prior to the trip for a cortisone injection.   A discussion was had with the patient regarding a right total knee replacement. A long discussion was had with the patient as what the total joint replacement would entail. A model was used to demonstrate the operation and to discuss bearing surfaces of the implants. The hospitalization and rehabilitation were discussed.  The use of perioperative antibiotics and DVT prophylaxis were discussed. The risks, benefits and alternatives to surgical intervention were discussed at length with the patient. Specific risks discussed included: infection, wound breakdown, numbness and damage to nerves, tendon, muscle, arteries or other blood vessels. The possibility of recurrent pain, no improvement in pain and actual worsening of the pain were also mentioned in conversation with the patient. Medical complications related to the patient's general medical health including deep vein thrombosis, pulmonary embolus, heart attack, stroke, death and other complications from anesthesia were discussed as well. The patient was told that we will take steps to minimize these risks by using sterile technique, antibiotics and DVT prophylaxis when appropriate and following the patient postoperatively in the clinic setting to monitor progress. The benefits of surgery were discussed with the patient including the potential to improve the current clinical condition through operative intervention. Alternatives to surgical intervention include continued conservative management which may yield less than optimal results in this particular patient. All questions were answered to the satisfaction of the patient. I reviewed the plan of care as well as a model of a total knee implant equivalent to the one that will be used for their total knee joint replacement.The patient agreed to the plan of care as well as the use of implants their knee total joint replacement.\par  \par I had a long discussion with the patient regarding the possibility of peroneal nerve palsy with valgus deformity correction. We discussed that this is a very rare complication of correction of the deformity. We discussed that if it does occur it may not be reversible and could lead to permanent foot drop which may require the use of an AFO. The patient understands this fully. Despite this risk the patient would like to proceed with surgery. We will do everything reasonable to prevent this from occurring. \par \par The patient would like to schedule surgery for late October/early November.

## 2019-07-23 NOTE — ADDENDUM
[FreeTextEntry1] : This note was authored by Riccardo Adair working as a medical scribe for Dr. Kike Bates. The note was reviewed, edited, and revised by Dr. Kike Bates whom is in agreement with the exam findings, imaging findings, and treatment plan. 07/23/2019.

## 2019-08-29 ENCOUNTER — APPOINTMENT (OUTPATIENT)
Dept: ORTHOPEDIC SURGERY | Facility: CLINIC | Age: 84
End: 2019-08-29
Payer: MEDICARE

## 2019-08-29 VITALS
HEIGHT: 68 IN | SYSTOLIC BLOOD PRESSURE: 145 MMHG | BODY MASS INDEX: 21.22 KG/M2 | HEART RATE: 51 BPM | WEIGHT: 140 LBS | DIASTOLIC BLOOD PRESSURE: 73 MMHG

## 2019-08-29 DIAGNOSIS — M17.11 UNILATERAL PRIMARY OSTEOARTHRITIS, RIGHT KNEE: ICD-10-CM

## 2019-08-29 PROCEDURE — 20610 DRAIN/INJ JOINT/BURSA W/O US: CPT

## 2019-08-29 PROCEDURE — 99213 OFFICE O/P EST LOW 20 MIN: CPT | Mod: 25

## 2019-08-29 NOTE — PHYSICAL EXAM
[de-identified] : Exam of the right knee shows a small effusion, 10 degrees of valgus which is partially correctable, MCL laxity, -3 to 120 degrees of flexion with pain. 5/5 motor strength bilaterally distally. Sensation intact distally.  [de-identified] : The patient appears well nourished  and in no apparent distress.  The patient is alert and oriented to person, place, and time.   Affect and mood appear normal. The head is normocephalic and atraumatic.  The eyes reveal normal sclera and extra ocular muscles are intact. The tongue is midline with no apparent lesions.  Skin shows normal turgor with no evidence of eczema or psoriasis.  No respiratory distress noted.  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

## 2019-08-29 NOTE — ADDENDUM
[FreeTextEntry1] : This note was authored by Riccardo Adair working as a medical scribe for Dr. Kike Bates. The note was reviewed, edited, and revised by Dr. Kike Bates whom is in agreement with the exam findings, imaging findings, and treatment plan. 08/29/2019.

## 2019-08-29 NOTE — HISTORY OF PRESENT ILLNESS
[de-identified] : The patient is a 83 year old female being seen for evaluation of her right knee. She has advanced osteoarthritis of the right knee. She is scheduled for surgery for November 4th of this year. She is ambulating and transferring with pain and stiffness. She reports pain is centered in the lateral aspect of the right knee. She reports difficulty walking for long distances and with stair-climbing. She reports symptoms of locking and giving way of the right knee. She comes in today for a cortisone injection prior to leaving for vacation.

## 2019-08-29 NOTE — DISCUSSION/SUMMARY
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 Patient is scheduled for surgery in November.  She received a cortisone injection today for temporary pain relief prior to going on vacation.

## 2019-08-29 NOTE — PROCEDURE
[de-identified] : Using sterile technique, 2cc of depomedrol 40mg/ml, 4cc of 1% plain lidocaine, and 2 cc 0.25% marcaine was drawn up into a sterile syringe. The right knee was then sterilely prepped with chlorhexidine. Ethyl chloride spray was used to anesthetize the skin and subQ tissue. The depomedrol/lidocaine/marcaine mixture was then injected into the knee joint in the anterolateral position. The patient tolerated the procedure well without difficulty. The patient was given instructions on the use of ice and anti-inflammatories post injection site soreness.\par

## 2019-10-15 ENCOUNTER — OUTPATIENT (OUTPATIENT)
Dept: OUTPATIENT SERVICES | Facility: HOSPITAL | Age: 84
LOS: 1 days | End: 2019-10-15
Payer: MEDICARE

## 2019-10-15 VITALS
RESPIRATION RATE: 16 BRPM | HEIGHT: 66 IN | SYSTOLIC BLOOD PRESSURE: 145 MMHG | DIASTOLIC BLOOD PRESSURE: 60 MMHG | HEART RATE: 55 BPM | TEMPERATURE: 97 F | WEIGHT: 134.48 LBS

## 2019-10-15 DIAGNOSIS — Z98.49 CATARACT EXTRACTION STATUS, UNSPECIFIED EYE: Chronic | ICD-10-CM

## 2019-10-15 DIAGNOSIS — M17.11 UNILATERAL PRIMARY OSTEOARTHRITIS, RIGHT KNEE: ICD-10-CM

## 2019-10-15 DIAGNOSIS — Z96.652 PRESENCE OF LEFT ARTIFICIAL KNEE JOINT: Chronic | ICD-10-CM

## 2019-10-15 DIAGNOSIS — Z13.89 ENCOUNTER FOR SCREENING FOR OTHER DISORDER: ICD-10-CM

## 2019-10-15 DIAGNOSIS — Z01.818 ENCOUNTER FOR OTHER PREPROCEDURAL EXAMINATION: ICD-10-CM

## 2019-10-15 DIAGNOSIS — Z29.9 ENCOUNTER FOR PROPHYLACTIC MEASURES, UNSPECIFIED: ICD-10-CM

## 2019-10-15 LAB
ANION GAP SERPL CALC-SCNC: 11 MMOL/L — SIGNIFICANT CHANGE UP (ref 5–17)
APTT BLD: 28.9 SEC — SIGNIFICANT CHANGE UP (ref 27.5–36.3)
BLD GP AB SCN SERPL QL: SIGNIFICANT CHANGE UP
BUN SERPL-MCNC: 18 MG/DL — SIGNIFICANT CHANGE UP (ref 8–20)
CALCIUM SERPL-MCNC: 9.7 MG/DL — SIGNIFICANT CHANGE UP (ref 8.6–10.2)
CHLORIDE SERPL-SCNC: 99 MMOL/L — SIGNIFICANT CHANGE UP (ref 98–107)
CO2 SERPL-SCNC: 28 MMOL/L — SIGNIFICANT CHANGE UP (ref 22–29)
CREAT SERPL-MCNC: 0.66 MG/DL — SIGNIFICANT CHANGE UP (ref 0.5–1.3)
GLUCOSE SERPL-MCNC: 96 MG/DL — SIGNIFICANT CHANGE UP (ref 70–115)
HBA1C BLD-MCNC: 5.6 % — SIGNIFICANT CHANGE UP (ref 4–5.6)
HCT VFR BLD CALC: 46.8 % — HIGH (ref 34.5–45)
HGB BLD-MCNC: 14.7 G/DL — SIGNIFICANT CHANGE UP (ref 11.5–15.5)
INR BLD: 0.97 RATIO — SIGNIFICANT CHANGE UP (ref 0.88–1.16)
MCHC RBC-ENTMCNC: 30 PG — SIGNIFICANT CHANGE UP (ref 27–34)
MCHC RBC-ENTMCNC: 31.4 GM/DL — LOW (ref 32–36)
MCV RBC AUTO: 95.5 FL — SIGNIFICANT CHANGE UP (ref 80–100)
MRSA PCR RESULT.: ABNORMAL
PLATELET # BLD AUTO: 239 K/UL — SIGNIFICANT CHANGE UP (ref 150–400)
POTASSIUM SERPL-MCNC: 5 MMOL/L — SIGNIFICANT CHANGE UP (ref 3.5–5.3)
POTASSIUM SERPL-SCNC: 5 MMOL/L — SIGNIFICANT CHANGE UP (ref 3.5–5.3)
PROTHROM AB SERPL-ACNC: 11.2 SEC — SIGNIFICANT CHANGE UP (ref 10–12.9)
RBC # BLD: 4.9 M/UL — SIGNIFICANT CHANGE UP (ref 3.8–5.2)
RBC # FLD: 12.7 % — SIGNIFICANT CHANGE UP (ref 10.3–14.5)
S AUREUS DNA NOSE QL NAA+PROBE: SIGNIFICANT CHANGE UP
SODIUM SERPL-SCNC: 138 MMOL/L — SIGNIFICANT CHANGE UP (ref 135–145)
WBC # BLD: 5.47 K/UL — SIGNIFICANT CHANGE UP (ref 3.8–10.5)
WBC # FLD AUTO: 5.47 K/UL — SIGNIFICANT CHANGE UP (ref 3.8–10.5)

## 2019-10-15 PROCEDURE — G0463: CPT

## 2019-10-15 PROCEDURE — 93010 ELECTROCARDIOGRAM REPORT: CPT

## 2019-10-15 PROCEDURE — 93005 ELECTROCARDIOGRAM TRACING: CPT

## 2019-10-15 NOTE — H&P PST ADULT - NSICDXPROBLEM_GEN_ALL_CORE_FT
PROBLEM DIAGNOSES  Problem: Need for prophylactic measure  Assessment and Plan:     Problem: Screening for substance abuse  Assessment and Plan: PROBLEM DIAGNOSES  Problem: Osteoarthritis of right knee  Assessment and Plan: Right total knee replacement  Medical Clearance    Problem: Need for prophylactic measure  Assessment and Plan:     Problem: Screening for substance abuse  Assessment and Plan: PROBLEM DIAGNOSES  Problem: Osteoarthritis of right knee  Assessment and Plan: Right total knee replacement  Medical Clearance    Problem: Need for prophylactic measure  Assessment and Plan: High risk.  Surgical team to evaluate need for pharmacologic VTE prophylaxis    Problem: Screening for substance abuse  Assessment and Plan: Opioid screening tool score =0.  Low risk for abuse

## 2019-10-15 NOTE — H&P PST ADULT - NSICDXFAMILYHX_GEN_ALL_CORE_FT
FAMILY HISTORY:  Father  Still living? No  Family history of lung cancer, Age at diagnosis: Age Unknown    Mother  Still living? No  Family history of acute myocardial infarction, Age at diagnosis: Age Unknown  Family history of hypertension, Age at diagnosis: Age Unknown

## 2019-10-15 NOTE — H&P PST ADULT - NSICDXPASTMEDICALHX_GEN_ALL_CORE_FT
PAST MEDICAL HISTORY:  Borderline diabetes mellitus     H/O: hypertension     Sleep apnea PAST MEDICAL HISTORY:  Borderline diabetes mellitus     Diverticulosis     H/O: hypertension     Left-sided carotid artery disease 40% blockage    Sleep apnea

## 2019-10-15 NOTE — PATIENT PROFILE ADULT - NSPROEDALEARNPREF_GEN_A_NUR
written material/pre-op instructions, surgical wash MRSA/MSSA  & pain management reviewed/verbal instruction/individual instruction

## 2019-10-15 NOTE — H&P PST ADULT - MUSCULOSKELETAL COMMENTS
Right knee pain Unable to visualize right knee secondary to clothing, but no pain with palpation.  Good muscle strength

## 2019-10-15 NOTE — H&P PST ADULT - HISTORY OF PRESENT ILLNESS
This is an 83 y.o female who presents to [facility] today. This is an 83 y.o female who presents to PST today.  The pt reports a history of "bone on bone" to her right knee and has been experiencing increased pain since July.  She had one Cortisone injection with improvement towards the end of August , however, she is now in anticipation of a right knee replacement

## 2019-10-16 RX ORDER — MUPIROCIN 20 MG/G
1 OINTMENT TOPICAL
Qty: 1 | Refills: 0
Start: 2019-10-16 | End: 2019-10-20

## 2019-10-24 RX ORDER — KETOROLAC TROMETHAMINE 30 MG/ML
15 SYRINGE (ML) INJECTION ONCE
Refills: 0 | Status: DISCONTINUED | OUTPATIENT
Start: 2019-11-04 | End: 2019-11-04

## 2019-11-03 ENCOUNTER — TRANSCRIPTION ENCOUNTER (OUTPATIENT)
Age: 84
End: 2019-11-03

## 2019-11-04 ENCOUNTER — APPOINTMENT (OUTPATIENT)
Dept: ORTHOPEDIC SURGERY | Facility: HOSPITAL | Age: 84
End: 2019-11-04

## 2019-11-04 ENCOUNTER — INPATIENT (INPATIENT)
Facility: HOSPITAL | Age: 84
LOS: 0 days | Discharge: HOME | DRG: 470 | End: 2019-11-05
Attending: ORTHOPAEDIC SURGERY | Admitting: ORTHOPAEDIC SURGERY
Payer: MEDICARE

## 2019-11-04 ENCOUNTER — TRANSCRIPTION ENCOUNTER (OUTPATIENT)
Age: 84
End: 2019-11-04

## 2019-11-04 VITALS
SYSTOLIC BLOOD PRESSURE: 138 MMHG | OXYGEN SATURATION: 99 % | TEMPERATURE: 98 F | DIASTOLIC BLOOD PRESSURE: 55 MMHG | HEART RATE: 77 BPM | HEIGHT: 66 IN | RESPIRATION RATE: 16 BRPM | WEIGHT: 134.48 LBS

## 2019-11-04 DIAGNOSIS — M17.11 UNILATERAL PRIMARY OSTEOARTHRITIS, RIGHT KNEE: ICD-10-CM

## 2019-11-04 DIAGNOSIS — Z29.9 ENCOUNTER FOR PROPHYLACTIC MEASURES, UNSPECIFIED: ICD-10-CM

## 2019-11-04 DIAGNOSIS — Z98.49 CATARACT EXTRACTION STATUS, UNSPECIFIED EYE: Chronic | ICD-10-CM

## 2019-11-04 DIAGNOSIS — R73.03 PREDIABETES: ICD-10-CM

## 2019-11-04 DIAGNOSIS — Z96.652 PRESENCE OF LEFT ARTIFICIAL KNEE JOINT: Chronic | ICD-10-CM

## 2019-11-04 DIAGNOSIS — I10 ESSENTIAL (PRIMARY) HYPERTENSION: ICD-10-CM

## 2019-11-04 DIAGNOSIS — Z96.651 PRESENCE OF RIGHT ARTIFICIAL KNEE JOINT: ICD-10-CM

## 2019-11-04 LAB
GLUCOSE BLDC GLUCOMTR-MCNC: 100 MG/DL — HIGH (ref 70–99)
GLUCOSE BLDC GLUCOMTR-MCNC: 100 MG/DL — HIGH (ref 70–99)
GLUCOSE BLDC GLUCOMTR-MCNC: 97 MG/DL — SIGNIFICANT CHANGE UP (ref 70–99)

## 2019-11-04 PROCEDURE — 27447 TOTAL KNEE ARTHROPLASTY: CPT | Mod: AS,RT

## 2019-11-04 PROCEDURE — 20985 CPTR-ASST DIR MS PX: CPT | Mod: AS

## 2019-11-04 PROCEDURE — 20985 CPTR-ASST DIR MS PX: CPT

## 2019-11-04 PROCEDURE — 27447 TOTAL KNEE ARTHROPLASTY: CPT | Mod: RT

## 2019-11-04 PROCEDURE — 99222 1ST HOSP IP/OBS MODERATE 55: CPT

## 2019-11-04 PROCEDURE — 73560 X-RAY EXAM OF KNEE 1 OR 2: CPT | Mod: 26,RT

## 2019-11-04 RX ORDER — ASPIRIN/CALCIUM CARB/MAGNESIUM 324 MG
325 TABLET ORAL
Refills: 0 | Status: DISCONTINUED | OUTPATIENT
Start: 2019-11-05 | End: 2019-11-05

## 2019-11-04 RX ORDER — ACETAMINOPHEN 500 MG
975 TABLET ORAL EVERY 8 HOURS
Refills: 0 | Status: DISCONTINUED | OUTPATIENT
Start: 2019-11-05 | End: 2019-11-05

## 2019-11-04 RX ORDER — ROSUVASTATIN CALCIUM 5 MG/1
1 TABLET ORAL
Qty: 0 | Refills: 0 | DISCHARGE

## 2019-11-04 RX ORDER — HYDROMORPHONE HYDROCHLORIDE 2 MG/ML
0.5 INJECTION INTRAMUSCULAR; INTRAVENOUS; SUBCUTANEOUS EVERY 4 HOURS
Refills: 0 | Status: DISCONTINUED | OUTPATIENT
Start: 2019-11-04 | End: 2019-11-05

## 2019-11-04 RX ORDER — TRANEXAMIC ACID 100 MG/ML
1000 INJECTION, SOLUTION INTRAVENOUS ONCE
Refills: 0 | Status: DISCONTINUED | OUTPATIENT
Start: 2019-11-04 | End: 2019-11-04

## 2019-11-04 RX ORDER — MAGNESIUM HYDROXIDE 400 MG/1
30 TABLET, CHEWABLE ORAL DAILY
Refills: 0 | Status: DISCONTINUED | OUTPATIENT
Start: 2019-11-04 | End: 2019-11-05

## 2019-11-04 RX ORDER — HYDROMORPHONE HYDROCHLORIDE 2 MG/ML
2 INJECTION INTRAMUSCULAR; INTRAVENOUS; SUBCUTANEOUS EVERY 4 HOURS
Refills: 0 | Status: DISCONTINUED | OUTPATIENT
Start: 2019-11-04 | End: 2019-11-05

## 2019-11-04 RX ORDER — OXYCODONE HYDROCHLORIDE 5 MG/1
10 TABLET ORAL
Refills: 0 | Status: DISCONTINUED | OUTPATIENT
Start: 2019-11-04 | End: 2019-11-05

## 2019-11-04 RX ORDER — CELECOXIB 200 MG/1
200 CAPSULE ORAL
Refills: 0 | Status: DISCONTINUED | OUTPATIENT
Start: 2019-11-05 | End: 2019-11-05

## 2019-11-04 RX ORDER — ONDANSETRON 8 MG/1
4 TABLET, FILM COATED ORAL EVERY 6 HOURS
Refills: 0 | Status: DISCONTINUED | OUTPATIENT
Start: 2019-11-04 | End: 2019-11-05

## 2019-11-04 RX ORDER — CEFAZOLIN SODIUM 1 G
1000 VIAL (EA) INJECTION
Refills: 0 | Status: DISCONTINUED | OUTPATIENT
Start: 2019-11-04 | End: 2019-11-04

## 2019-11-04 RX ORDER — ATORVASTATIN CALCIUM 80 MG/1
40 TABLET, FILM COATED ORAL AT BEDTIME
Refills: 0 | Status: DISCONTINUED | OUTPATIENT
Start: 2019-11-04 | End: 2019-11-05

## 2019-11-04 RX ORDER — ONDANSETRON 8 MG/1
4 TABLET, FILM COATED ORAL ONCE
Refills: 0 | Status: DISCONTINUED | OUTPATIENT
Start: 2019-11-04 | End: 2019-11-04

## 2019-11-04 RX ORDER — SODIUM CHLORIDE 9 MG/ML
1000 INJECTION, SOLUTION INTRAVENOUS
Refills: 0 | Status: DISCONTINUED | OUTPATIENT
Start: 2019-11-04 | End: 2019-11-04

## 2019-11-04 RX ORDER — OXYCODONE HYDROCHLORIDE 5 MG/1
5 TABLET ORAL
Refills: 0 | Status: DISCONTINUED | OUTPATIENT
Start: 2019-11-04 | End: 2019-11-05

## 2019-11-04 RX ORDER — CEFAZOLIN SODIUM 1 G
2000 VIAL (EA) INJECTION
Refills: 0 | Status: COMPLETED | OUTPATIENT
Start: 2019-11-04 | End: 2019-11-04

## 2019-11-04 RX ORDER — SENNA PLUS 8.6 MG/1
2 TABLET ORAL AT BEDTIME
Refills: 0 | Status: DISCONTINUED | OUTPATIENT
Start: 2019-11-04 | End: 2019-11-05

## 2019-11-04 RX ORDER — FERROUS SULFATE 325(65) MG
325 TABLET ORAL DAILY
Refills: 0 | Status: DISCONTINUED | OUTPATIENT
Start: 2019-11-04 | End: 2019-11-05

## 2019-11-04 RX ORDER — KETOROLAC TROMETHAMINE 30 MG/ML
15 SYRINGE (ML) INJECTION ONCE
Refills: 0 | Status: DISCONTINUED | OUTPATIENT
Start: 2019-11-04 | End: 2019-11-04

## 2019-11-04 RX ORDER — FENTANYL CITRATE 50 UG/ML
25 INJECTION INTRAVENOUS
Refills: 0 | Status: DISCONTINUED | OUTPATIENT
Start: 2019-11-04 | End: 2019-11-04

## 2019-11-04 RX ORDER — CELECOXIB 200 MG/1
400 CAPSULE ORAL ONCE
Refills: 0 | Status: COMPLETED | OUTPATIENT
Start: 2019-11-04 | End: 2019-11-04

## 2019-11-04 RX ORDER — ACETAMINOPHEN 500 MG
975 TABLET ORAL ONCE
Refills: 0 | Status: COMPLETED | OUTPATIENT
Start: 2019-11-04 | End: 2019-11-04

## 2019-11-04 RX ORDER — CEFAZOLIN SODIUM 1 G
2000 VIAL (EA) INJECTION ONCE
Refills: 0 | Status: DISCONTINUED | OUTPATIENT
Start: 2019-11-04 | End: 2019-11-04

## 2019-11-04 RX ORDER — GABAPENTIN 400 MG/1
300 CAPSULE ORAL ONCE
Refills: 0 | Status: COMPLETED | OUTPATIENT
Start: 2019-11-04 | End: 2019-11-04

## 2019-11-04 RX ORDER — VANCOMYCIN HCL 1 G
1000 VIAL (EA) INTRAVENOUS ONCE
Refills: 0 | Status: COMPLETED | OUTPATIENT
Start: 2019-11-04 | End: 2019-11-04

## 2019-11-04 RX ORDER — SODIUM CHLORIDE 9 MG/ML
3 INJECTION INTRAMUSCULAR; INTRAVENOUS; SUBCUTANEOUS EVERY 8 HOURS
Refills: 0 | Status: DISCONTINUED | OUTPATIENT
Start: 2019-11-04 | End: 2019-11-04

## 2019-11-04 RX ORDER — SODIUM CHLORIDE 9 MG/ML
1000 INJECTION, SOLUTION INTRAVENOUS
Refills: 0 | Status: DISCONTINUED | OUTPATIENT
Start: 2019-11-04 | End: 2019-11-05

## 2019-11-04 RX ORDER — VANCOMYCIN HCL 1 G
1000 VIAL (EA) INTRAVENOUS
Refills: 0 | Status: COMPLETED | OUTPATIENT
Start: 2019-11-04 | End: 2019-11-04

## 2019-11-04 RX ORDER — ATENOLOL 25 MG/1
25 TABLET ORAL DAILY
Refills: 0 | Status: DISCONTINUED | OUTPATIENT
Start: 2019-11-05 | End: 2019-11-05

## 2019-11-04 RX ORDER — KETOROLAC TROMETHAMINE 30 MG/ML
15 SYRINGE (ML) INJECTION EVERY 6 HOURS
Refills: 0 | Status: DISCONTINUED | OUTPATIENT
Start: 2019-11-04 | End: 2019-11-05

## 2019-11-04 RX ADMIN — Medication 15 MILLIGRAM(S): at 23:19

## 2019-11-04 RX ADMIN — SODIUM CHLORIDE 80 MILLILITER(S): 9 INJECTION, SOLUTION INTRAVENOUS at 21:11

## 2019-11-04 RX ADMIN — Medication 15 MILLIGRAM(S): at 17:59

## 2019-11-04 RX ADMIN — Medication 975 MILLIGRAM(S): at 06:16

## 2019-11-04 RX ADMIN — Medication 100 MILLIGRAM(S): at 23:40

## 2019-11-04 RX ADMIN — Medication 250 MILLIGRAM(S): at 18:00

## 2019-11-04 RX ADMIN — Medication 15 MILLIGRAM(S): at 23:16

## 2019-11-04 RX ADMIN — Medication 15 MILLIGRAM(S): at 18:14

## 2019-11-04 RX ADMIN — Medication 100 MILLIGRAM(S): at 15:18

## 2019-11-04 RX ADMIN — Medication 250 MILLIGRAM(S): at 06:40

## 2019-11-04 RX ADMIN — ATORVASTATIN CALCIUM 40 MILLIGRAM(S): 80 TABLET, FILM COATED ORAL at 21:10

## 2019-11-04 RX ADMIN — CELECOXIB 400 MILLIGRAM(S): 200 CAPSULE ORAL at 06:16

## 2019-11-04 RX ADMIN — GABAPENTIN 300 MILLIGRAM(S): 400 CAPSULE ORAL at 06:16

## 2019-11-04 NOTE — PHYSICAL THERAPY INITIAL EVALUATION ADULT - GENERAL OBSERVATIONS, REHAB EVAL
Pt received sitting upright in bed, RN consulted, +venous compression boots, IV disconnected by RN, c/o 0/10 pain pre and post PT and no other symptoms to report. /76 prior to PT.

## 2019-11-04 NOTE — CONSULT NOTE ADULT - SUBJECTIVE AND OBJECTIVE BOX
PMD : Shant     Patient is a 83y old  Female who is s/p R TKA , POD # 0 , tolerated procedure well .    CC: R knee pain       HPI:  This is an 83 y.o female who reports a history of "bone on bone" to her right knee and has been experiencing increased pain since July.  She had one Cortisone injection with improvement towards the end of August , , was taking Motrin with some relief . Now she is s/p R TKA , POD # 0.      PAST MEDICAL & SURGICAL HISTORY:  Diverticulosis  Left-sided carotid artery disease: 40% blockage  Borderline diabetes mellitus  Sleep apnea  H/O: hypertension  S/P total knee replacement, left  History of cataract surgery  History of D&C      Social History:  Tobacco - ex smoker , quit 35 yrs ago   ETOH - occasionally   Illicit drug abuse - denies    FAMILY HISTORY:  Family history of hypertension (Mother)  Family history of acute myocardial infarction (Mother)  Family history of lung cancer (Father)      Allergies    codeine (Nausea; Vomiting)  Demerol HCl (Nausea; Vomiting)  penicillin (Hives)    Intolerances    HOME MEDICATIONS :     · 	atenolol 25 mg oral tablet: Last Dose Taken:  , 1 tab(s) orally once a day  · 	calcium: Last Dose Taken:  , 1000 milligram(s)  once a day  · 	Crestor 40 mg oral tablet: Last Dose Taken:  , 1 tab(s) orally once a day  · 	vitamin d: orally once a day    REVIEW OF SYSTEMS:    CONSTITUTIONAL: No fever, weight loss, or fatigue  EYES: No eye pain, visual disturbances, or discharge  NECK: No pain or stiffness  RESPIRATORY: No cough, wheezing, chills or hemoptysis; No shortness of breath  CARDIOVASCULAR: No chest pain, palpitations, dizziness, or leg swelling  GASTROINTESTINAL: No abdominal or epigastric pain. No nausea, vomiting, or hematemesis; No diarrhea or constipation. No melena or hematochezia.  GENITOURINARY: No dysuria, frequency, hematuria, or incontinence  NEUROLOGICAL: No headaches, memory loss, loss of strength, numbness, or tremors  SKIN: No itching, burning, rashes, or lesions   LYMPH NODES: No enlarged glands  ENDOCRINE: No heat or cold intolerance; No hair loss  MUSCULOSKELETAL: R knee pain - chronic   PSYCHIATRIC: No depression, anxiety, mood swings, or difficulty sleeping  HEME/LYMPH: No easy bruising, or bleeding gums  ALLERGY AND IMMUNOLOGIC: No hives or eczema    MEDICATIONS  (STANDING):  atorvastatin 40 milliGRAM(s) Oral at bedtime  ceFAZolin   IVPB 1000 milliGRAM(s) IV Intermittent <User Schedule>  ferrous    sulfate 325 milliGRAM(s) Oral daily  ketorolac   Injectable 15 milliGRAM(s) IV Push once  ketorolac   Injectable 15 milliGRAM(s) IV Push once  lactated ringers. 1000 milliLiter(s) (80 mL/Hr) IV Continuous <Continuous>  multivitamin 1 Tablet(s) Oral daily  vancomycin  IVPB 1000 milliGRAM(s) IV Intermittent <User Schedule>    MEDICATIONS  (PRN):  aluminum hydroxide/magnesium hydroxide/simethicone Suspension 30 milliLiter(s) Oral four times a day PRN Indigestion  HYDROmorphone   Tablet 2 milliGRAM(s) Oral every 4 hours PRN Severe Pain (7 - 10)  HYDROmorphone  Injectable 0.5 milliGRAM(s) IV Push every 4 hours PRN breaK THROUGH PAIN  magnesium hydroxide Suspension 30 milliLiter(s) Oral daily PRN Constipation  ondansetron Injectable 4 milliGRAM(s) IV Push every 6 hours PRN Nausea and/or Vomiting  oxyCODONE    IR 5 milliGRAM(s) Oral every 3 hours PRN Mild Pain (1 - 3)  oxyCODONE    IR 10 milliGRAM(s) Oral every 3 hours PRN Moderate Pain (4 - 6)  senna 2 Tablet(s) Oral at bedtime PRN Constipation      Vital Signs Last 24 Hrs  T(C): 36.3 (04 Nov 2019 15:58), Max: 36.5 (04 Nov 2019 05:57)  T(F): 97.4 (04 Nov 2019 15:58), Max: 97.7 (04 Nov 2019 05:57)  HR: 58 (04 Nov 2019 15:58) (56 - 77)  BP: 113/54 (04 Nov 2019 15:58) (113/54 - 143/76)  BP(mean): --  RR: 18 (04 Nov 2019 15:58) (14 - 18)  SpO2: 96% (04 Nov 2019 15:58) (96% - 100%)    PHYSICAL EXAM:    GENERAL: NAD, well-groomed, well-developed  HEAD:  Atraumatic, Normocephalic  EYES: EOMI, PERRLA, conjunctiva and sclera clear  NECK: Supple, No JVD, Normal thyroid  NERVOUS SYSTEM:  Alert & Oriented X3, no focal deficit   CHEST/LUNG: CTA  b/l,  no rales, rhonchi, wheezing, or rubs  HEART: Regular rate and rhythm; No murmurs, rubs, or gallops  ABDOMEN: Soft, Nontender, Nondistended; Bowel sounds present  EXTREMITIES:  2+ Peripheral Pulses, No clubbing, cyanosis, or edema ,   LYMPH: No lymphadenopathy noted  SKIN: No rashes or lesions    LABS: Pending       RADIOLOGY & ADDITIONAL STUDIES:    < from: Xray Knee 1 or 2 Views, Right (11.04.19 @ 10:48) >   EXAM:  KNEE-RIGHT                          PROCEDURE DATE:  11/04/2019          INTERPRETATION:  XR KNEE RIGHT    History: Right knee replacement.    Technique: Frontal and lateral views of the right knee.    Comparison:  None.    Findings:    Status post right knee replacement. Air and swelling are seen within the   soft tissues consistent with recent surgery.    Impression:    Status post right knee replacement.      JAIR POLANCO   This document has been electronically signed.Nov 4 2019 11:33AM              < end of copied text > PMD : Shant     Patient is a 83y old  Female who is s/p R TKA , POD # 0 , tolerated procedure well .    CC: R knee pain       HPI:  This is an 83 y.o female who reports a history of "bone on bone" to her right knee and has been experiencing increased pain since July.  She had one Cortisone injection with improvement towards the end of August , , was taking Motrin with some relief . Now she is s/p R TKA , POD # 0.      PAST MEDICAL & SURGICAL HISTORY:  Diverticulosis  Left-sided carotid artery disease: 40% blockage  Borderline diabetes mellitus  Sleep apnea  H/O: hypertension  S/P total knee replacement, left  History of cataract surgery  History of D&C      Social History:  Tobacco - ex smoker , quit 35 yrs ago   ETOH - occasionally   Illicit drug abuse - denies    FAMILY HISTORY:  Family history of hypertension (Mother)  Family history of acute myocardial infarction (Mother)  Family history of lung cancer (Father)      Allergies    codeine (Nausea; Vomiting)  Demerol HCl (Nausea; Vomiting)  penicillin (Hives)    Intolerances    HOME MEDICATIONS :     · 	atenolol 25 mg oral tablet: Last Dose Taken:  , 1 tab(s) orally once a day  · 	calcium: Last Dose Taken:  , 1000 milligram(s)  once a day  · 	Crestor 40 mg oral tablet: Last Dose Taken:  , 1 tab(s) orally once a day  · 	vitamin d: orally once a day    REVIEW OF SYSTEMS:    CONSTITUTIONAL: No fever, weight loss, or fatigue  EYES: No eye pain, visual disturbances, or discharge  NECK: No pain or stiffness  RESPIRATORY: No cough, wheezing, chills or hemoptysis; No shortness of breath  CARDIOVASCULAR: No chest pain, palpitations, dizziness, or leg swelling  GASTROINTESTINAL: No abdominal or epigastric pain. No nausea, vomiting, or hematemesis; No diarrhea or constipation. No melena or hematochezia.  GENITOURINARY: No dysuria, frequency, hematuria, or incontinence  NEUROLOGICAL: No headaches, memory loss, loss of strength, numbness, or tremors  SKIN: No itching, burning, rashes, or lesions   LYMPH NODES: No enlarged glands  ENDOCRINE: No heat or cold intolerance; No hair loss  MUSCULOSKELETAL: R knee pain - chronic   PSYCHIATRIC: No depression, anxiety, mood swings, or difficulty sleeping  HEME/LYMPH: No easy bruising, or bleeding gums  ALLERGY AND IMMUNOLOGIC: No hives or eczema    MEDICATIONS  (STANDING):  atorvastatin 40 milliGRAM(s) Oral at bedtime  ceFAZolin   IVPB 1000 milliGRAM(s) IV Intermittent <User Schedule>  ferrous    sulfate 325 milliGRAM(s) Oral daily  ketorolac   Injectable 15 milliGRAM(s) IV Push once  ketorolac   Injectable 15 milliGRAM(s) IV Push once  lactated ringers. 1000 milliLiter(s) (80 mL/Hr) IV Continuous <Continuous>  multivitamin 1 Tablet(s) Oral daily  vancomycin  IVPB 1000 milliGRAM(s) IV Intermittent <User Schedule>    MEDICATIONS  (PRN):  aluminum hydroxide/magnesium hydroxide/simethicone Suspension 30 milliLiter(s) Oral four times a day PRN Indigestion  HYDROmorphone   Tablet 2 milliGRAM(s) Oral every 4 hours PRN Severe Pain (7 - 10)  HYDROmorphone  Injectable 0.5 milliGRAM(s) IV Push every 4 hours PRN breaK THROUGH PAIN  magnesium hydroxide Suspension 30 milliLiter(s) Oral daily PRN Constipation  ondansetron Injectable 4 milliGRAM(s) IV Push every 6 hours PRN Nausea and/or Vomiting  oxyCODONE    IR 5 milliGRAM(s) Oral every 3 hours PRN Mild Pain (1 - 3)  oxyCODONE    IR 10 milliGRAM(s) Oral every 3 hours PRN Moderate Pain (4 - 6)  senna 2 Tablet(s) Oral at bedtime PRN Constipation      Vital Signs Last 24 Hrs  T(C): 36.3 (04 Nov 2019 15:58), Max: 36.5 (04 Nov 2019 05:57)  T(F): 97.4 (04 Nov 2019 15:58), Max: 97.7 (04 Nov 2019 05:57)  HR: 58 (04 Nov 2019 15:58) (56 - 77)  BP: 113/54 (04 Nov 2019 15:58) (113/54 - 143/76)  BP(mean): --  RR: 18 (04 Nov 2019 15:58) (14 - 18)  SpO2: 96% (04 Nov 2019 15:58) (96% - 100%)    PHYSICAL EXAM:    GENERAL: NAD, well-groomed, well-developed  HEAD:  Atraumatic, Normocephalic  EYES: EOMI, PERRLA, conjunctiva and sclera clear  NECK: Supple, No JVD, Normal thyroid  NERVOUS SYSTEM:  Alert & Oriented X3, no focal deficit   CHEST/LUNG: CTA  b/l,  no rales, rhonchi, wheezing, or rubs  HEART: Regular rate and rhythm; No murmurs, rubs, or gallops  ABDOMEN: Soft, Nontender, Nondistended; Bowel sounds present  EXTREMITIES:  2+ Peripheral Pulses, No clubbing, cyanosis, or edema , R knee ACE wrap + , clean and dry   LYMPH: No lymphadenopathy noted  SKIN: No rashes or lesions    LABS: Pending       RADIOLOGY & ADDITIONAL STUDIES:    < from: Xray Knee 1 or 2 Views, Right (11.04.19 @ 10:48) >   EXAM:  KNEE-RIGHT                          PROCEDURE DATE:  11/04/2019          INTERPRETATION:  XR KNEE RIGHT    History: Right knee replacement.    Technique: Frontal and lateral views of the right knee.    Comparison:  None.    Findings:    Status post right knee replacement. Air and swelling are seen within the   soft tissues consistent with recent surgery.    Impression:    Status post right knee replacement.      JAIR POLANCO   This document has been electronically signed.Nov 4 2019 11:33AM              < end of copied text >

## 2019-11-04 NOTE — PHYSICAL THERAPY INITIAL EVALUATION ADULT - PERTINENT HX OF CURRENT PROBLEM, REHAB EVAL
83 y.o female. The pt reports a history of "bone on bone" to her right knee and has been experiencing increased pain since July. Pt s/p right TKR.

## 2019-11-04 NOTE — DISCHARGE NOTE PROVIDER - NSDCFUADDINST_GEN_ALL_CORE_FT
The patient will be seen in the office in 2 weeks for wound check. Sutures/Staples/Tape will be removed at that time. Patient may shower after post-op day #3 (11/7/19). The dressing is to be removed on post op day #9 (11/13/19). IF THE DRESSING BECOMES SOILED BEFORE THE REMOVAL DATE, CHANGE WITH A SIMILAR DRESSING. IF THE DRESSING BECOMES STAINED WITH DISCHARGE, CONTACT THE OFFICE FOR FURTHER DIRECTIONS. The patient will contact the office if the wound becomes red, has increasing pain, develops bleeding or discharge, an injury occurs, or has other concerns. The patient will continue PT consistent with total knee replacement. The patient will continue aspirin 325mg twice daily for 6 weeks for blood clot prevention.  The patient will take OXYCODONE AND TYLENOL for pain control and titrate according to prescription and patient needs. The patient will take Senna-S while taking oxycodone to prevent narcotic associated constipation.  Additionally, increase water intake (drink at least 8 glasses of water daily) and try adding fiber to the diet by eating fruits, vegetables and foods that are rich in grains. If constipation is experienced, contact the medical/primary care provider to discuss further treatment options. The patient is FULL weight bearing. Elevation of the lower leg is recommended to reduce swelling. The patient will be seen in the office in 2 weeks for wound check. Sutures/Staples/Tape will be removed at that time. Patient may shower after post-op day #3 (11/7/19). The dressing is to be removed on post op day #9 (11/13/19). IF THE DRESSING BECOMES SOILED BEFORE THE REMOVAL DATE, CHANGE WITH A SIMILAR DRESSING. IF THE DRESSING BECOMES STAINED WITH DISCHARGE, CONTACT THE OFFICE FOR FURTHER DIRECTIONS. The patient will contact the office if the wound becomes red, has increasing pain, develops bleeding or discharge, an injury occurs, or has other concerns. The patient will continue PT consistent with total knee replacement. The patient will continue aspirin 325mg twice daily for 6 weeks for blood clot prevention.  The patient will take TRAMADOL AND TYLENOL for pain control and titrate according to prescription and patient needs. The patient will take celebrex 200mg twice a day for 3 weeks. The patient will take Senna-S while taking oxycodone to prevent narcotic associated constipation.  Additionally, increase water intake (drink at least 8 glasses of water daily) and try adding fiber to the diet by eating fruits, vegetables and foods that are rich in grains. If constipation is experienced, contact the medical/primary care provider to discuss further treatment options. The patient is FULL weight bearing. Elevation of the lower leg is recommended to reduce swelling.

## 2019-11-04 NOTE — DISCHARGE NOTE PROVIDER - CARE PROVIDER_API CALL
Kike Bates)  Orthopaedic Surgery  [address] [facility] [address] [facility] [address]  [place] [address]  Phone: [phone]  Fax: [phone]  Follow Up Time:

## 2019-11-04 NOTE — PROGRESS NOTE ADULT - SUBJECTIVE AND OBJECTIVE BOX
Orthopedic PA Postop Note  Patient S/P RIGHT TKA  Patient in bed comfortable   RIGHT Leg  Dressing C/D/I   DP Pulse intact   Calf Soft NT  Dorsi/Plantar Flexion/EHL/FHL intact   Sensation intact to light touch    Vital Signs Last 24 Hrs  T(C): 36.3 (04 Nov 2019 15:58), Max: 36.5 (04 Nov 2019 05:57)  T(F): 97.4 (04 Nov 2019 15:58), Max: 97.7 (04 Nov 2019 05:57)  HR: 58 (04 Nov 2019 15:58) (56 - 77)  BP: 113/54 (04 Nov 2019 15:58) (113/54 - 143/76)  BP(mean): --  RR: 18 (04 Nov 2019 15:58) (14 - 18)  SpO2: 96% (04 Nov 2019 15:58) (96% - 100%)    < from: Xray Knee 1 or 2 Views, Right (11.04.19 @ 10:48) >     EXAM:  KNEE-RIGHT                          PROCEDURE DATE:  11/04/2019          INTERPRETATION:  XR KNEE RIGHT    History: Right knee replacement.    Technique: Frontal and lateral views of the right knee.    Comparison:  None.    Findings:    Status post right knee replacement. Air and swelling are seen within the   soft tissues consistent with recent surgery.    Impression:    Status post right knee replacement.                JAIR POLANOC   This document has been electronically signed.Nov 4 2019 11:33AM        < end of copied text >      A/P:  S/P RIGHT TKA  1. DVTP - ASA  2. Physical Therapy   3. Pain Control as clinically indicated

## 2019-11-04 NOTE — PHYSICAL THERAPY INITIAL EVALUATION ADULT - RANGE OF MOTION EXAMINATION, REHAB EVAL
bilateral lower extremity ROM was WFL (within functional limits)/Knee flexion decreased due to bandage.

## 2019-11-04 NOTE — DISCHARGE NOTE PROVIDER - HOSPITAL COURSE
The patient underwent a RIGHT TOTAL KNEE REPLACEMENT on 11/4/19. The patient received antibiotics consistent with SCIP guidelines. The patient underwent the procedure and had no intra-operative complications. Post-operatively, the patient was seen by medicine and PT. The patient received ASPIRIN for DVTP. The patient received pain medications per orthopedic pain managment protocol and the pain was appropriately controlled. The patient did not have any post-operative medical complications. The patient was discharged in stable condition.

## 2019-11-04 NOTE — DISCHARGE NOTE PROVIDER - NSDCFUSCHEDAPPT_GEN_ALL_CORE_FT
VERÓNICA STEVENSON ; 11/19/2019 ; [facility] [address] [facility]  VERÓNICA STEVENSON ; 11/21/2019 ; [facility] [address]  VERÓNICA STEVENSON ; 12/09/2019 ; [facility] [address] [facility] VERÓNICA STEVENSON ; 11/19/2019 ; [facility] [address] [facility]  VERÓNICA STEVENSON ; 11/21/2019 ; [facility] [address]  VERÓNICA STEVENSON ; 12/09/2019 ; [facility] [address] [facility] VERÓNICA STEVENSON ; 11/19/2019 ; [facility] [address] [facility]  VERÓNICA STEVENSON ; 11/21/2019 ; [facility] [address]  VERÓNICA STEVENSON ; 12/09/2019 ; [facility] [address] [facility] VERÓNICA STEVENSON ; 11/19/2019 ; [facility] [address] [facility]  VERÓNICA STEVENSON ; 11/21/2019 ; [facility] [address]  VERÓNICA STEVENSON ; 12/09/2019 ; [facility] [address] [facility]

## 2019-11-04 NOTE — PHYSICAL THERAPY INITIAL EVALUATION ADULT - LEVEL OF INDEPENDENCE: STAND/SIT, REHAB EVAL
contact guard/Pt uncontrolled descent to toilet despite verbal cues for use of hand rails, SPT by pt's side for assitance for descent to maintain safety

## 2019-11-04 NOTE — PHYSICAL THERAPY INITIAL EVALUATION ADULT - LEVEL OF INDEPENDENCE: GAIT, REHAB EVAL
contact guard/Pt experienced loss of balance while ambulating, assistance needed from SPT to restore balance

## 2019-11-04 NOTE — CONSULT NOTE ADULT - ASSESSMENT
This is an 83 y.o female who reports a history of "bone on bone" to her right knee and has been experiencing increased pain since July.  She had one Cortisone injection with improvement towards the end of August , , was taking Motrin with some relief . Now she is s/p R TKA , POD # 0.

## 2019-11-04 NOTE — DISCHARGE NOTE PROVIDER - NSDCMRMEDTOKEN_GEN_ALL_CORE_FT
atenolol 25 mg oral tablet: 1 tab(s) orally once a day  calcium: 1000 milligram(s)  once a day  Crestor 40 mg oral tablet: 1 tab(s) orally once a day  mupirocin 2% topical ointment: Apply to both nostrils twice a day for five days  vitamin d: orally once a day acetaminophen 325 mg oral tablet: 3 tab(s) orally every 8 hours  Aspirin Enteric Coated 325 mg oral delayed release tablet: 1 tab(s) orally 2 times a day for a total of 6 weeks.   calcium: 1000 milligram(s)  once a day  CeleBREX 200 mg oral capsule: 1 cap(s) orally 2 times a day for 3 weeks  Crestor 40 mg oral tablet: 1 tab(s) orally once a day  vitamin d: orally once a day acetaminophen 325 mg oral tablet: 3 tab(s) orally every 8 hours  Aspirin Enteric Coated 325 mg oral delayed release tablet: 1 tab(s) orally 2 times a day for a total of 6 weeks.   atenolol 25 mg oral tablet: 1 tab(s) orally once a day  calcium: 1000 milligram(s)  once a day  CeleBREX 200 mg oral capsule: 1 cap(s) orally 2 times a day for 3 weeks  Crestor 40 mg oral tablet: 1 tab(s) orally once a day  Senna S 50 mg-8.6 mg oral tablet: 2 tab(s) orally once a day (at bedtime)   traMADol 50 mg oral tablet: 1 tab(s) orally every 4 to 6 hours, As Needed  Pain MDD:6  vitamin d: orally once a day

## 2019-11-04 NOTE — PHYSICAL THERAPY INITIAL EVALUATION ADULT - ADDITIONAL COMMENTS
Pt lives alone at home on a single floor, no steps to enter. Pt was independent with all activity prior to admission, owns rolling walker and commode. Pt wants to go back to doing her usual hobbies such as golfing and sailing.

## 2019-11-04 NOTE — PHYSICAL THERAPY INITIAL EVALUATION ADULT - CRITERIA FOR SKILLED THERAPEUTIC INTERVENTIONS
impairments found/home with home PT and rolling walker/anticipated discharge recommendation/anticipated equipment needs at discharge

## 2019-11-04 NOTE — CONSULT NOTE ADULT - PROBLEM/RECOMMENDATION-4
DISPLAY PLAN FREE TEXT DISPLAY PLAN FREE TEXT DISPLAY PLAN FREE TEXT DISPLAY PLAN FREE TEXT DISPLAY PLAN FREE TEXT DISPLAY PLAN FREE TEXT DISPLAY PLAN FREE TEXT

## 2019-11-05 ENCOUNTER — TRANSCRIPTION ENCOUNTER (OUTPATIENT)
Age: 84
End: 2019-11-05

## 2019-11-05 VITALS
DIASTOLIC BLOOD PRESSURE: 67 MMHG | SYSTOLIC BLOOD PRESSURE: 122 MMHG | RESPIRATION RATE: 16 BRPM | OXYGEN SATURATION: 98 % | TEMPERATURE: 98 F | HEART RATE: 68 BPM

## 2019-11-05 LAB
ANION GAP SERPL CALC-SCNC: 9 MMOL/L — SIGNIFICANT CHANGE UP (ref 5–17)
BUN SERPL-MCNC: 15 MG/DL — SIGNIFICANT CHANGE UP (ref 8–20)
CALCIUM SERPL-MCNC: 8.3 MG/DL — LOW (ref 8.6–10.2)
CHLORIDE SERPL-SCNC: 105 MMOL/L — SIGNIFICANT CHANGE UP (ref 98–107)
CO2 SERPL-SCNC: 25 MMOL/L — SIGNIFICANT CHANGE UP (ref 22–29)
CREAT SERPL-MCNC: 0.56 MG/DL — SIGNIFICANT CHANGE UP (ref 0.5–1.3)
GLUCOSE SERPL-MCNC: 121 MG/DL — HIGH (ref 70–115)
HCT VFR BLD CALC: 35.2 % — SIGNIFICANT CHANGE UP (ref 34.5–45)
HGB BLD-MCNC: 11.2 G/DL — LOW (ref 11.5–15.5)
MCHC RBC-ENTMCNC: 30.1 PG — SIGNIFICANT CHANGE UP (ref 27–34)
MCHC RBC-ENTMCNC: 31.8 GM/DL — LOW (ref 32–36)
MCV RBC AUTO: 94.6 FL — SIGNIFICANT CHANGE UP (ref 80–100)
PLATELET # BLD AUTO: 180 K/UL — SIGNIFICANT CHANGE UP (ref 150–400)
POTASSIUM SERPL-MCNC: 4.4 MMOL/L — SIGNIFICANT CHANGE UP (ref 3.5–5.3)
POTASSIUM SERPL-SCNC: 4.4 MMOL/L — SIGNIFICANT CHANGE UP (ref 3.5–5.3)
RBC # BLD: 3.72 M/UL — LOW (ref 3.8–5.2)
RBC # FLD: 12.9 % — SIGNIFICANT CHANGE UP (ref 10.3–14.5)
SODIUM SERPL-SCNC: 139 MMOL/L — SIGNIFICANT CHANGE UP (ref 135–145)
WBC # BLD: 6.07 K/UL — SIGNIFICANT CHANGE UP (ref 3.8–10.5)
WBC # FLD AUTO: 6.07 K/UL — SIGNIFICANT CHANGE UP (ref 3.8–10.5)

## 2019-11-05 PROCEDURE — 97163 PT EVAL HIGH COMPLEX 45 MIN: CPT

## 2019-11-05 PROCEDURE — 80048 BASIC METABOLIC PNL TOTAL CA: CPT

## 2019-11-05 PROCEDURE — 36415 COLL VENOUS BLD VENIPUNCTURE: CPT

## 2019-11-05 PROCEDURE — 99232 SBSQ HOSP IP/OBS MODERATE 35: CPT

## 2019-11-05 PROCEDURE — C1776: CPT

## 2019-11-05 PROCEDURE — 97167 OT EVAL HIGH COMPLEX 60 MIN: CPT

## 2019-11-05 PROCEDURE — C1713: CPT

## 2019-11-05 PROCEDURE — 82962 GLUCOSE BLOOD TEST: CPT

## 2019-11-05 PROCEDURE — 73560 X-RAY EXAM OF KNEE 1 OR 2: CPT

## 2019-11-05 PROCEDURE — 85027 COMPLETE CBC AUTOMATED: CPT

## 2019-11-05 RX ORDER — SENNOSIDES/DOCUSATE SODIUM 8.6MG-50MG
2 TABLET ORAL
Qty: 20 | Refills: 0
Start: 2019-11-05

## 2019-11-05 RX ORDER — ATENOLOL 25 MG/1
1 TABLET ORAL
Qty: 0 | Refills: 0 | DISCHARGE
Start: 2019-11-05

## 2019-11-05 RX ORDER — CELECOXIB 200 MG/1
1 CAPSULE ORAL
Qty: 42 | Refills: 0
Start: 2019-11-05 | End: 2019-11-25

## 2019-11-05 RX ORDER — ASPIRIN/CALCIUM CARB/MAGNESIUM 324 MG
1 TABLET ORAL
Qty: 84 | Refills: 0
Start: 2019-11-05 | End: 2019-12-16

## 2019-11-05 RX ORDER — TRAMADOL HYDROCHLORIDE 50 MG/1
25 TABLET ORAL
Refills: 0 | Status: DISCONTINUED | OUTPATIENT
Start: 2019-11-05 | End: 2019-11-05

## 2019-11-05 RX ORDER — ACETAMINOPHEN 500 MG
3 TABLET ORAL
Qty: 0 | Refills: 0 | DISCHARGE
Start: 2019-11-05

## 2019-11-05 RX ORDER — TRAMADOL HYDROCHLORIDE 50 MG/1
50 TABLET ORAL
Refills: 0 | Status: DISCONTINUED | OUTPATIENT
Start: 2019-11-05 | End: 2019-11-05

## 2019-11-05 RX ORDER — TRAMADOL HYDROCHLORIDE 50 MG/1
1 TABLET ORAL
Qty: 40 | Refills: 0
Start: 2019-11-05

## 2019-11-05 RX ADMIN — Medication 1 TABLET(S): at 13:10

## 2019-11-05 RX ADMIN — Medication 975 MILLIGRAM(S): at 05:27

## 2019-11-05 RX ADMIN — Medication 325 MILLIGRAM(S): at 05:27

## 2019-11-05 RX ADMIN — Medication 325 MILLIGRAM(S): at 13:10

## 2019-11-05 RX ADMIN — Medication 15 MILLIGRAM(S): at 05:30

## 2019-11-05 RX ADMIN — CELECOXIB 200 MILLIGRAM(S): 200 CAPSULE ORAL at 05:30

## 2019-11-05 RX ADMIN — Medication 975 MILLIGRAM(S): at 05:30

## 2019-11-05 RX ADMIN — CELECOXIB 200 MILLIGRAM(S): 200 CAPSULE ORAL at 05:29

## 2019-11-05 RX ADMIN — Medication 15 MILLIGRAM(S): at 05:28

## 2019-11-05 NOTE — DISCHARGE NOTE NURSING/CASE MANAGEMENT/SOCIAL WORK - PATIENT PORTAL LINK FT
You can access the FollowMyHealth Patient Portal offered by [facility] by registering at the following website: [email or web address]. By joining [facility]’s FollowMyHealth portal, you will also be able to view your health information using other applications (apps) compatible with our system.

## 2019-11-05 NOTE — OCCUPATIONAL THERAPY INITIAL EVALUATION ADULT - ADDITIONAL COMMENTS
Pt lives in house with no MAN and no steps inside; bedroom and bathroom are on main level. Bathroom has bathtub with curtains. Pt owns RW, cane, commode. Pt is right handed. Pt drives. Pt's sister will be staying with her for 1 week following discharge to assist as needed. Pt reports she has many friends and neighbors local to assist as needed after sister leaves. Pt lives in first floor, single level convo with no MAN and no steps inside; bedroom and bathroom are on main level. Bathroom has bathtub with curtains. Pt owns [place], cane, commode. Pt is right handed. Pt drives. Pt's sister will be staying with her for 1 week following discharge to assist as needed. Pt reports she has many friends and neighbors local to assist as needed after sister leaves.

## 2019-11-05 NOTE — PROGRESS NOTE ADULT - ASSESSMENT
This is an 83 y.o female who reports a history of "bone on bone" to her right knee and has been experiencing increased pain since July.  She had one Cortisone injection with improvement towards the end of August , , was taking Motrin with some relief . Now she is s/p R TKA , POD # 1.     Problem/Recommendation - 1:  Problem: Primary osteoarthritis of right knee. Recommendation: s/p R TKA.     Problem/Recommendation - 2:  ·  Problem: Status post right knee replacement.  Recommendation: PT/OT/pain mgmt  DVT prophylaxis- as per ortho  Abx as per SCIP- given   Incentive spirometry  Prophylaxis of opioid  induced constipation.      Problem/Recommendation - 3:  ·  Problem: Borderline diabetes mellitus.  Recommendation: Hx of , HgA1C 5.5 - diet controlled.      Problem/Recommendation - 4:  ·  Problem: Essential hypertension.  Recommendation: continue home meds.      Problem/Recommendation - 5:  ·  Problem: Prophylactic measure.  Recommendation: dvt prophylaxis - as per ortho team - on ASA BID .    Problem / Plan - 6: Anemia , LIKELY  acute blood lost anemia - secondary to surgical blood lost , asymptomatic.    Medically stable to d/c once cleared by physical therapy / ortho .

## 2019-11-05 NOTE — OCCUPATIONAL THERAPY INITIAL EVALUATION ADULT - LEVEL OF INDEPENDENCE:TOILET, OT EVAL
not observed however pt reports using bathroom prior to evaluation with no issues/concerns/independent

## 2019-11-05 NOTE — OCCUPATIONAL THERAPY INITIAL EVALUATION ADULT - ASSISTIVE DEVICE FOR TOILET TRANSFER, REHAB EVAL
rolling walker/elevated toilet seat with left grab bar; recommend use of commode over standard toilet at home to increase surface height and provide armrests, pt in agreement

## 2019-11-05 NOTE — PROGRESS NOTE ADULT - SUBJECTIVE AND OBJECTIVE BOX
Patient seen and examined . S/p r R TKA  , POD # 1. Doing well , pain well controlled , no n/v , voiding well , no complaints .    CC : R knee pain well controlled     MEDICATIONS  (STANDING):  acetaminophen   Tablet .. 975 milliGRAM(s) Oral every 8 hours  aspirin enteric coated 325 milliGRAM(s) Oral two times a day  ATENolol  Tablet 25 milliGRAM(s) Oral daily  atorvastatin 40 milliGRAM(s) Oral at bedtime  celecoxib 200 milliGRAM(s) Oral two times a day  ferrous    sulfate 325 milliGRAM(s) Oral daily  ketorolac   Injectable 15 milliGRAM(s) IV Push every 6 hours  lactated ringers. 1000 milliLiter(s) (80 mL/Hr) IV Continuous <Continuous>  multivitamin 1 Tablet(s) Oral daily    MEDICATIONS  (PRN):  aluminum hydroxide/magnesium hydroxide/simethicone Suspension 30 milliLiter(s) Oral four times a day PRN Indigestion  HYDROmorphone  Injectable 0.5 milliGRAM(s) IV Push every 4 hours PRN breaK THROUGH PAIN  magnesium hydroxide Suspension 30 milliLiter(s) Oral daily PRN Constipation  ondansetron Injectable 4 milliGRAM(s) IV Push every 6 hours PRN Nausea and/or Vomiting  senna 2 Tablet(s) Oral at bedtime PRN Constipation  traMADol 25 milliGRAM(s) Oral every 3 hours PRN Mild Pain (1 - 3)  traMADol 50 milliGRAM(s) Oral every 3 hours PRN Moderate Pain (4 - 6)      LABS:                          11.2   6.07  )-----------( 180      ( 05 Nov 2019 06:41 )             35.2     11-05    139  |  105  |  15.0  ----------------------------<  121<H>  4.4   |  25.0  |  0.56    Ca    8.3<L>      05 Nov 2019 06:41    RADIOLOGY & ADDITIONAL TESTS:    < from: Xray Knee 1 or 2 Views, Right (11.04.19 @ 10:48) >     EXAM:  KNEE-RIGHT                          PROCEDURE DATE:  11/04/2019          INTERPRETATION:  XR KNEE RIGHT    History: Right knee replacement.    Technique: Frontal and lateral views of the right knee.    Comparison:  None.    Findings:    Status post right knee replacement. Air and swelling are seen within the   soft tissues consistent with recent surgery.    Impression:    Status post right knee replacement.      JAIR S SHERRI   This document has been electronically signed.Nov 4 2019 11:33AM    < end of copied text >        REVIEW OF SYSTEMS:    CONSTITUTIONAL: No fever, weight loss, or fatigue  EYES: No eye pain, visual disturbances, or discharge  ENMT:  No difficulty hearing, tinnitus, vertigo; No sinus or throat pain  NECK: No pain or stiffness  RESPIRATORY: No cough, wheezing, chills or hemoptysis; No shortness of breath  CARDIOVASCULAR: No chest pain, palpitations, dizziness, or leg swelling  GASTROINTESTINAL: No abdominal or epigastric pain. No nausea, vomiting, or hematemesis; No diarrhea or constipation. No melena or hematochezia.  GENITOURINARY: No dysuria, frequency, hematuria, or incontinence  NEUROLOGICAL: No headaches, memory loss, loss of strength, numbness, or tremors  SKIN: No itching, burning, rashes, or lesions   LYMPH NODES: No enlarged glands  ENDOCRINE: No heat or cold intolerance; No hair loss  MUSCULOSKELETAL: R knee pain well controlled   PSYCHIATRIC: No depression, anxiety, mood swings, or difficulty sleeping  HEME/LYMPH: No easy bruising, or bleeding gums  ALLERGY AND IMMUNOLOGIC: No hives or eczema    Vital Signs Last 24 Hrs  T(C): 36.7 (05 Nov 2019 07:43), Max: 36.9 (05 Nov 2019 05:09)  T(F): 98.1 (05 Nov 2019 07:43), Max: 98.4 (05 Nov 2019 05:09)  HR: 68 (05 Nov 2019 07:43) (56 - 76)  BP: 122/67 (05 Nov 2019 07:43) (92/46 - 143/76)  BP(mean): --  RR: 16 (05 Nov 2019 07:43) (14 - 18)  SpO2: 98% (05 Nov 2019 07:43) (95% - 100%)  PHYSICAL EXAM:    GENERAL: NAD, well-groomed, well-developed  HEAD:  Atraumatic, Normocephalic  EYES: EOMI, PERRLA, conjunctiva and sclera clear  NECK: Supple, No JVD, Normal thyroid  NERVOUS SYSTEM:  Alert & Oriented X3, no focal deficit  CHEST/LUNG: CTA b/l ,  no  rales, rhonchi, wheezing, or rubs  HEART: Regular rate and rhythm; No murmurs, rubs, or gallops  ABDOMEN: Soft, Nontender, Nondistended; Bowel sounds present  EXTREMITIES:  2+ Peripheral Pulses, No clubbing, cyanosis, or edema , R knee ACE wrap + , clean and dry   LYMPH: No lymphadenopathy noted  SKIN: No rashes or lesions

## 2019-11-05 NOTE — PROGRESS NOTE ADULT - SUBJECTIVE AND OBJECTIVE BOX
VERÓNICA STEVENSON    [id number]    History: 83y Female is status post right total knee arthroplasty on 11/4, POD #1. Patient is doing well and is comfortable. The patient's pain is controlled using the prescribed pain medications. Patient reports that she has not taken any narcotics post operatively but reports that she took tramadol for her last knee replacement and prefers that for pain control. The patient is participating in physical therapy. Denies nausea, vomiting, chest pain, shortness of breath, abdominal pain or fever. No new complaints.                          11.2   6.07  )-----------( 180      ( 05 Nov 2019 06:41 )             35.2         MEDICATIONS  (STANDING):  acetaminophen   Tablet .. 975 milliGRAM(s) Oral every 8 hours  aspirin enteric coated 325 milliGRAM(s) Oral two times a day  ATENolol  Tablet 25 milliGRAM(s) Oral daily  atorvastatin 40 milliGRAM(s) Oral at bedtime  celecoxib 200 milliGRAM(s) Oral two times a day  ferrous    sulfate 325 milliGRAM(s) Oral daily  ketorolac   Injectable 15 milliGRAM(s) IV Push every 6 hours  lactated ringers. 1000 milliLiter(s) (80 mL/Hr) IV Continuous <Continuous>  multivitamin 1 Tablet(s) Oral daily    MEDICATIONS  (PRN):  aluminum hydroxide/magnesium hydroxide/simethicone Suspension 30 milliLiter(s) Oral four times a day PRN Indigestion  HYDROmorphone   Tablet 2 milliGRAM(s) Oral every 4 hours PRN Severe Pain (7 - 10)  HYDROmorphone  Injectable 0.5 milliGRAM(s) IV Push every 4 hours PRN breaK THROUGH PAIN  magnesium hydroxide Suspension 30 milliLiter(s) Oral daily PRN Constipation  ondansetron Injectable 4 milliGRAM(s) IV Push every 6 hours PRN Nausea and/or Vomiting  oxyCODONE    IR 5 milliGRAM(s) Oral every 3 hours PRN Mild Pain (1 - 3)  oxyCODONE    IR 10 milliGRAM(s) Oral every 3 hours PRN Moderate Pain (4 - 6)  senna 2 Tablet(s) Oral at bedtime PRN Constipation      Physical exam: The knee is wrapped with ace. Ace is clean dry and intact. The calf is supple nontender. Passive range of motion is acceptable to due postoperative pain. Sensation to light touch is grossly intact distally. Motor function distally is 5/5. Extensor hallucis longus and flexor hallucis longus are intact. No foot drop. 2+ dorsalis pedis pulse. Capillary refill is less than 2 seconds. No cyanosis.    Primary Orthopedic Assessment:  • s/p RIGHT total knee replacement      Plan:   • DVT prophylaxis as prescribed (asa 325) including use of compression devices and ankle pumps  • Continue physical therapy  • Weightbearing as tolerated of the right lower extremity with assistance of a walker  • Incentive spirometry encouraged  • Pain control as clinically indicated (medications will be switched to tramadol)  • Discharge planning – anticipated discharge is Home today

## 2019-11-12 ENCOUNTER — OTHER (OUTPATIENT)
Age: 84
End: 2019-11-12

## 2019-11-14 ENCOUNTER — OTHER (OUTPATIENT)
Age: 84
End: 2019-11-14

## 2019-11-19 ENCOUNTER — APPOINTMENT (OUTPATIENT)
Dept: ORTHOPEDIC SURGERY | Facility: CLINIC | Age: 84
End: 2019-11-19

## 2019-11-21 ENCOUNTER — APPOINTMENT (OUTPATIENT)
Dept: ORTHOPEDIC SURGERY | Facility: CLINIC | Age: 84
End: 2019-11-21
Payer: MEDICARE

## 2019-11-21 VITALS
SYSTOLIC BLOOD PRESSURE: 149 MMHG | WEIGHT: 140 LBS | DIASTOLIC BLOOD PRESSURE: 74 MMHG | HEART RATE: 60 BPM | BODY MASS INDEX: 21.22 KG/M2 | HEIGHT: 68 IN

## 2019-11-21 PROCEDURE — 73562 X-RAY EXAM OF KNEE 3: CPT | Mod: RT

## 2019-11-21 PROCEDURE — 99024 POSTOP FOLLOW-UP VISIT: CPT

## 2019-11-21 NOTE — HISTORY OF PRESENT ILLNESS
[Doing Well] : is doing well [Excellent Pain Control] : has excellent pain control [No Sign of Infection] : is showing no signs of infection [de-identified] : S/P Right computer-assisted TKR, DOS: 11/4/19,\par S/P Left TKR, DOS: 1/4/2017.  [de-identified] : The patient is a 83 year old female 17 days s/p right TKR. She was discharged directly to home. For DVT prophylaxis she is on one regular strength aspirin twice per day. She denies systemic symptoms of fever or chills. For pain she is taking Celebrex and Tylenol as needed. She notes adequate pain relief. She is concluded physical therapy at home. She is able to transfer and ambulate with a cane. [de-identified] : Exam of the right knee shows a well healing incision. No instability, full extension, flexion of 105 degrees. 5/5 motor strength bilaterally distally. Sensation intact distally.  [de-identified] : Xray- 3 views of the right knee - well aligned and well fixed right knee replacement.  [de-identified] : The patient is doing very well 17 days after right TKR. The patient will be transitioned to outpatient physical therapy and a prescription was given for that. The patient will continue aspirin 325 mg twice per day for DVT prophylaxis for the next month. The importance of physical therapy and achieving full knee extension as well as progressing knee flexion was reinforced. Overall the patient is very happy with their outcome so far. Followup in 4 weeks with repeat x-rays.\par \par

## 2019-11-21 NOTE — ADDENDUM
[FreeTextEntry1] : This note was authored by Riccardo Adair working as a medical scribe for Dr. Kike Bates. The note was reviewed, edited, and revised by Dr. Kike Bates whom is in agreement with the exam findings, imaging findings, and treatment plan. 11/21/2019.

## 2019-12-02 ENCOUNTER — OTHER (OUTPATIENT)
Age: 84
End: 2019-12-02

## 2019-12-03 ENCOUNTER — OTHER (OUTPATIENT)
Age: 84
End: 2019-12-03

## 2019-12-06 ENCOUNTER — OTHER (OUTPATIENT)
Age: 84
End: 2019-12-06

## 2019-12-11 ENCOUNTER — OTHER (OUTPATIENT)
Age: 84
End: 2019-12-11

## 2019-12-23 ENCOUNTER — OTHER (OUTPATIENT)
Age: 84
End: 2019-12-23

## 2019-12-30 PROBLEM — K57.90 DIVERTICULOSIS OF INTESTINE, PART UNSPECIFIED, WITHOUT PERFORATION OR ABSCESS WITHOUT BLEEDING: Chronic | Status: ACTIVE | Noted: 2019-10-15

## 2019-12-30 PROBLEM — I73.9 PERIPHERAL VASCULAR DISEASE, UNSPECIFIED: Chronic | Status: ACTIVE | Noted: 2019-10-15

## 2020-01-01 NOTE — H&P PST ADULT - ENMT
Patient refused Lovenox at this time and states she will think about taking medication later. Education provided on medication. Understanding voiced. details… No oral lesions; no gross abnormalities

## 2020-01-02 ENCOUNTER — APPOINTMENT (OUTPATIENT)
Dept: ORTHOPEDIC SURGERY | Facility: CLINIC | Age: 85
End: 2020-01-02

## 2020-02-02 ENCOUNTER — FORM ENCOUNTER (OUTPATIENT)
Age: 85
End: 2020-02-02

## 2020-02-03 ENCOUNTER — APPOINTMENT (OUTPATIENT)
Dept: ORTHOPEDIC SURGERY | Facility: CLINIC | Age: 85
End: 2020-02-03
Payer: MEDICARE

## 2020-02-03 VITALS
HEART RATE: 50 BPM | DIASTOLIC BLOOD PRESSURE: 72 MMHG | WEIGHT: 140 LBS | SYSTOLIC BLOOD PRESSURE: 131 MMHG | BODY MASS INDEX: 21.22 KG/M2 | HEIGHT: 68 IN

## 2020-02-03 DIAGNOSIS — Z96.651 AFTERCARE FOLLOWING JOINT REPLACEMENT SURGERY: ICD-10-CM

## 2020-02-03 DIAGNOSIS — Z47.1 AFTERCARE FOLLOWING JOINT REPLACEMENT SURGERY: ICD-10-CM

## 2020-02-03 DIAGNOSIS — M25.559 PAIN IN UNSPECIFIED HIP: ICD-10-CM

## 2020-02-03 DIAGNOSIS — Z96.659 PRESENCE OF UNSPECIFIED ARTIFICIAL KNEE JOINT: ICD-10-CM

## 2020-02-03 PROCEDURE — 73562 X-RAY EXAM OF KNEE 3: CPT | Mod: 26,RT

## 2020-02-03 PROCEDURE — 99212 OFFICE O/P EST SF 10 MIN: CPT

## 2020-02-03 RX ORDER — ASPIRIN 325 MG/1
325 TABLET, COATED ORAL
Refills: 0 | Status: DISCONTINUED | COMMUNITY
End: 2020-02-03

## 2020-02-03 RX ORDER — CELECOXIB 200 MG/1
200 CAPSULE ORAL
Refills: 0 | Status: DISCONTINUED | COMMUNITY
End: 2020-02-03

## 2020-02-03 RX ORDER — ROSUVASTATIN CALCIUM 20 MG/1
20 TABLET, FILM COATED ORAL
Refills: 0 | Status: ACTIVE | COMMUNITY

## 2020-02-03 NOTE — HISTORY OF PRESENT ILLNESS
[de-identified] : S/P Right computer-assisted TKR, DOS: 11/4/19,\par S/P Left TKR, DOS: 1/4/2017. \par  [de-identified] : The patient is an 84-year-old female who presents for followup of her right TKR. She is now 3 months postop. She is doing very well and is very pleased with the results of her surgery. She has concluded aspirin for DVT prophylaxis. She denies systemic symptoms of fever or chills. She notes no complaints of pain. She has concluded physical therapy. She is able to transfer and ambulate independently and reciprocate stairs without difficulty. She is returned to all activities of daily living including playing golf. [de-identified] : The right knee is without erythema, abrasions, or ecchymosis. The incision is healed. There no signs or symptoms of infection. No effusion. Functional range of motion as measured by goniometer from 0-120°. No gross instability. Extensor mechanism is intact. Quadriceps strength is adequate. Calf is nontender Homans test is negative. [de-identified] : X-ray-3 views of the right knee show the prosthesis to be in satisfactory alignment with a quiet bone, cement, implant interface. Patella is midline. [de-identified] : The patient is an 84-year-old female 3 months status post right TKR. Incision is well-healed. She has progressed nicely. [de-identified] : Patient is an 84-year-old female 3 months status post right TKR. She has concluded physical therapy. She will continue with her home exercise program. She has returned to all regular activities of daily living. She is very pleased with the results of her surgery. Dental prophylaxis was reviewed. Followup on the anniversary of her surgery. She notes good understanding and agreement with the plan of care.

## 2020-02-10 ENCOUNTER — APPOINTMENT (OUTPATIENT)
Dept: ORTHOPEDIC SURGERY | Facility: CLINIC | Age: 85
End: 2020-02-10
Payer: MEDICARE

## 2020-02-10 VITALS
BODY MASS INDEX: 21.22 KG/M2 | HEART RATE: 63 BPM | WEIGHT: 140 LBS | SYSTOLIC BLOOD PRESSURE: 130 MMHG | DIASTOLIC BLOOD PRESSURE: 77 MMHG | HEIGHT: 68 IN

## 2020-02-10 DIAGNOSIS — M70.61 TROCHANTERIC BURSITIS, RIGHT HIP: ICD-10-CM

## 2020-02-10 PROCEDURE — 73502 X-RAY EXAM HIP UNI 2-3 VIEWS: CPT | Mod: 26,RT

## 2020-02-10 PROCEDURE — 99213 OFFICE O/P EST LOW 20 MIN: CPT

## 2020-02-10 NOTE — HISTORY OF PRESENT ILLNESS
[de-identified] : The patient is an 84-year-old female who presents for evaluation of her right hip. She denies injury or trauma. She reports after increasing walking exercise she developed right lateral hip pain. This is intermittent in nature. Pain is present when lying on the right side at night and when she increases her walking. She denies radiation into the groin. She denies mechanical symptoms of locking or giving way. She denies limping.

## 2020-02-10 NOTE — DISCUSSION/SUMMARY
[de-identified] : The patient is 84-year-old female with right hip trochanteric bursitis. The patient was offered but declined a cortisone injection. She has Celebrex at home that she is going to take regularly for the next week or 2. I recommended a course of physical therapy and a prescription was provided. She was made aware of any changes in her clinical condition that would warrant urgent evaluation or intervention. Followup as needed. She notes good understanding and agreement with the plan of care.

## 2020-02-10 NOTE — PHYSICAL EXAM
[de-identified] : Right hip is without erythema, abrasions, or ecchymosis. Pain with palpation in the region of the greater trochanter. Functional range of passive, smooth motion. Lateral hip pain with internal rotation. Straight leg raise without difficulty. No lower extremity swelling. Calf is nontender Homans test is negative. 5 out of 5 muscle strength bilaterally. Neurovascular status intact. [de-identified] : The patient appears well nourished  and in no apparent distress.  The patient is alert and oriented to person, place, and time.   Affect and mood appear normal.    The head is normocephalic and atraumatic.  The eyes reveal normal sclera and extra ocular muscles are intact. The tongue is midline with no apparent lesions.  Skin shows normal turgor with no evidence of eczema or psoriasis.  No respiratory distress noted.  Sensation grossly intact.\par  [de-identified] : X-ray-AP of the pelvis and 2 views of the right hip show well-preserved joint space. Enthesopathic changes are noted in the region of the greater trochanter.

## 2020-04-09 PROBLEM — M25.559 HIP PAIN: Status: ACTIVE | Noted: 2020-02-03

## 2020-06-04 NOTE — BRIEF OPERATIVE NOTE - IV INFUSIONS - CRYSTALLOIDS
Assessment of patient's ostomy, stool noted coming from right edge beneath skin barrier. Pt prefers 2 piece 2 3/4" Angi appliance so pt was assisted in placing appliance. Still having difficulty visualizing stoma but was able to cut skin barrier adequately, needed some assistance in applying stoma ring and skin barrier around stoma. Reinforced the need to empty in a timely manner, remove gas, and don't change pouch unless whole appliance needs to be changed (2x/week and prn). Pt with 1 piece appliances as well as 2 piece appliances, stoma rings, and skin barrier wipes for discharge, 1300 ml

## 2021-06-02 ENCOUNTER — APPOINTMENT (OUTPATIENT)
Dept: NEUROLOGY | Facility: CLINIC | Age: 86
End: 2021-06-02
Payer: MEDICARE

## 2021-06-02 ENCOUNTER — APPOINTMENT (OUTPATIENT)
Dept: NEUROLOGY | Facility: CLINIC | Age: 86
End: 2021-06-02

## 2021-06-02 VITALS
WEIGHT: 140 LBS | HEIGHT: 68 IN | SYSTOLIC BLOOD PRESSURE: 128 MMHG | HEART RATE: 88 BPM | DIASTOLIC BLOOD PRESSURE: 76 MMHG | BODY MASS INDEX: 21.22 KG/M2

## 2021-06-02 DIAGNOSIS — Z86.79 PERSONAL HISTORY OF OTHER DISEASES OF THE CIRCULATORY SYSTEM: ICD-10-CM

## 2021-06-02 PROCEDURE — 99204 OFFICE O/P NEW MOD 45 MIN: CPT

## 2021-06-02 RX ORDER — ASPIRIN 81 MG
81 TABLET, DELAYED RELEASE (ENTERIC COATED) ORAL
Refills: 0 | Status: DISCONTINUED | COMMUNITY
End: 2021-06-02

## 2021-06-02 RX ORDER — GABAPENTIN 100 MG
100 TABLET ORAL
Refills: 0 | Status: ACTIVE | COMMUNITY

## 2021-06-02 RX ORDER — SAW/PYGEUM/BETA/HERB/D3/B6/ZN 30 MG-25MG
CAPSULE ORAL
Refills: 0 | Status: ACTIVE | COMMUNITY

## 2021-06-02 NOTE — DISCUSSION/SUMMARY
[FreeTextEntry1] : 85-year-old woman who is here for initial consultation of likely peripheral neuropathy with pain symptoms that were exacerbated by her  left foot tendinopathy and arthritis.  Will check reversible causes of neuropathy.  We will also have her return for EMG testing to evaluate for any asymmetry in the distribution of her peripheral neuropathy.  Patient will continue the gabapentin at 200 mg at night.  Patient  should also consider starting aspirin 81 with perhaps GI prophylaxis as she does have a history of carotid stenosis on the left side.\par \par I spent the time noted on the day of this patient encounter preparing for, providing and documenting the above E/M service and counseling and educate patient on differential, workup, disease course, and treatment/management. Education was provided to the patient during this encounter. All questions and concerns were answered and addressed in detail. The patient verbalized understanding and agreed to plan. Patient was advised to continue to monitor for neurologic symptoms and to notify my office or go to the nearest emergency room if there are any changes. Any orders/referrals and communications were provided as well. \par Side effects of the above medications were discussed in detail including but not limited to applicable black box warning and teratogenicity as appropriate. \par Patient was advised to bring previous records to my office. \par \par \par

## 2021-06-02 NOTE — PHYSICAL EXAM
[General Appearance - Alert] : alert [Oriented To Time, Place, And Person] : oriented to person, place, and time [Person] : oriented to person [Place] : oriented to place [Time] : oriented to time [Short Term Intact] : short term memory intact [Fluency] : fluency intact [Current Events] : adequate knowledge of current events [Cranial Nerves Optic (II)] : visual acuity intact bilaterally,  visual fields full to confrontation, pupils equal round and reactive to light [Cranial Nerves Oculomotor (III)] : extraocular motion intact [Cranial Nerves Vestibulocochlear (VIII)] : hearing was intact bilaterally [Cranial Nerves Accessory (XI - Cranial And Spinal)] : head turning and shoulder shrug symmetric [Motor Tone] : muscle tone was normal in all four extremities [Motor Strength] : muscle strength was normal in all four extremities [Sensation Vibration Decrease] : vibration was intact [Proprioception] : proprioception was intact [Romberg's Sign] : Romberg's sign was negtive [Dysesthesia] : no dysesthesia [Hyperesthesia] : no hyperesthesia [Abnormal Walk] : normal gait [Coordination - Dysmetria Impaired Finger-to-Nose Bilateral] : not present [1+] : Patella left 1+ [FreeTextEntry7] : Decreased to light touch and pinprick to the ankle on the right and to right below the knee on the left.

## 2021-06-02 NOTE — HISTORY OF PRESENT ILLNESS
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  Patient woke up 1 morning and had left foot pain that started on the top of her feet and radiated to the arch of her foot.  Patient states that the pain was so severe that she was unable to walk.  Eventually the symptom occurred on the right foot as well and she went to the urgent care on March 9.  There was no fracture that was noted and she saw Dr. Gutierrez a foot orthopedic doctor.  Patient had MRI of the foot which showed tendinopathy and arthritis.  Patient was started on gabapentin 200 mg at night by her PMD Dr. Pierson.  Patient states that the symptoms are somewhat better.  Patient continues to experience toe numbness and occasionally tingling whenever she is seated.  The gabapentin helped with the foot cramps and she states that it is feeling better however she still has trouble walking.  Patient started alpha lipoic acid and B12 supplementation when it was recommended by her friend who was a physician.  Patient was advised that if she does not have any known history of vitamin B 12 deficiency she should not take the supplement.\par \par Of note patient was noted to have carotid stenosis around 40% in her left carotid. She was supposed to be on aspirin 81 mg however it upsets her stomach and she has stopped.  Patient was advised that the medical management is continuation of aspirin 81 mg and perhaps she should see her PMD regarding any treatment for her GERD.  This is to decrease her risk of infarct.

## 2021-06-08 LAB
25(OH)D3 SERPL-MCNC: 52.8 NG/ML
AFP-TM SERPL-MCNC: 6.1 NG/ML
ALBUMIN SERPL ELPH-MCNC: 4.2 G/DL
ALP BLD-CCNC: 50 U/L
ALT SERPL-CCNC: 8 U/L
ANION GAP SERPL CALC-SCNC: 13 MMOL/L
AST SERPL-CCNC: 14 U/L
BILIRUB SERPL-MCNC: 0.4 MG/DL
BUN SERPL-MCNC: 23 MG/DL
CALCIUM SERPL-MCNC: 9.8 MG/DL
CHLORIDE SERPL-SCNC: 103 MMOL/L
CO2 SERPL-SCNC: 23 MMOL/L
CREAT SERPL-MCNC: 0.81 MG/DL
DEPRECATED KAPPA LC FREE/LAMBDA SER: 1.35 RATIO
FOLATE SERPL-MCNC: 14.4 NG/ML
GLUCOSE SERPL-MCNC: 92 MG/DL
HBV CORE IGG+IGM SER QL: NONREACTIVE
HBV SURFACE AB SER QL: NONREACTIVE
HBV SURFACE AG SER QL: NONREACTIVE
HCV AB SER QL: NONREACTIVE
HCV S/CO RATIO: 0.12 S/CO
KAPPA LC CSF-MCNC: 1.4 MG/DL
KAPPA LC SERPL-MCNC: 1.89 MG/DL
POTASSIUM SERPL-SCNC: 4.8 MMOL/L
PROT SERPL-MCNC: 6.3 G/DL
SODIUM SERPL-SCNC: 139 MMOL/L
T PALLIDUM AB SER QL IA: NEGATIVE
T4 SERPL-MCNC: 7.5 UG/DL
TSH SERPL-ACNC: 2.81 UIU/ML
VIT B12 SERPL-MCNC: 550 PG/ML

## 2021-06-09 LAB
ALBUMIN MFR SERPL ELPH: 60.3 %
ALBUMIN SERPL-MCNC: 3.8 G/DL
ALBUMIN/GLOB SERPL: 1.5 RATIO
ALPHA1 GLOB MFR SERPL ELPH: 4.6 %
ALPHA1 GLOB SERPL ELPH-MCNC: 0.3 G/DL
ALPHA2 GLOB MFR SERPL ELPH: 11.3 %
ALPHA2 GLOB SERPL ELPH-MCNC: 0.7 G/DL
B-GLOBULIN MFR SERPL ELPH: 12.1 %
B-GLOBULIN SERPL ELPH-MCNC: 0.8 G/DL
COPPER SERPL-MCNC: 100 UG/DL
GAMMA GLOB FLD ELPH-MCNC: 0.7 G/DL
GAMMA GLOB MFR SERPL ELPH: 11.7 %
INTERPRETATION SERPL IEP-IMP: NORMAL
M PROTEIN SPEC IFE-MCNC: NORMAL
PROT SERPL-MCNC: 6.3 G/DL
PROT SERPL-MCNC: 6.3 G/DL
ZINC SERPL-MCNC: 82 UG/DL

## 2021-06-10 LAB
A-TOCOPHEROL VIT E SERPL-MCNC: 10.3 MG/L
BETA+GAMMA TOCOPHEROL SERPL-MCNC: 1.1 MG/L
CRYOGLOB SERPL-MCNC: NEGATIVE
GLIADIN IGA SER QL: <5 UNITS
GLIADIN IGG SER QL: <5 UNITS
GLIADIN PEPTIDE IGA SER-ACNC: NEGATIVE
GLIADIN PEPTIDE IGG SER-ACNC: NEGATIVE

## 2021-06-11 LAB — VGCC-P/Q BIND AB SER-SCNC: 0 PMOL/L

## 2021-06-14 ENCOUNTER — TRANSCRIPTION ENCOUNTER (OUTPATIENT)
Age: 86
End: 2021-06-14

## 2021-06-14 NOTE — ASU PREOP CHECKLIST - PATIENT SENT TO
Discharge    Patient discharged to [facility] via wheelchair with  [facility]  Care plan note: See POC note    Listed belongings gathered and returned to patient. Yes  Belongings returned to patient from security/pharmacy Yes  Care Plan and Patient education resolved: Yes  Prescriptions if needed, hard copies sent with patient  Yes  Home and hospital acquired medications returned to patient: NA  Medication Bin checked and emptied on discharge Yes  Follow up appointment made for patient: No   operating room

## 2021-06-15 ENCOUNTER — NON-APPOINTMENT (OUTPATIENT)
Age: 86
End: 2021-06-15

## 2021-06-15 LAB
B BURGDOR AB SER-IMP: NEGATIVE
B BURGDOR IGM PATRN SER IB-IMP: NEGATIVE
B BURGDOR18KD IGG SER QL IB: NORMAL
B BURGDOR23KD IGG SER QL IB: NORMAL
B BURGDOR23KD IGM SER QL IB: NORMAL
B BURGDOR28KD IGG SER QL IB: NORMAL
B BURGDOR30KD IGG SER QL IB: NORMAL
B BURGDOR31KD IGG SER QL IB: PRESENT
B BURGDOR39KD IGG SER QL IB: NORMAL
B BURGDOR39KD IGM SER QL IB: NORMAL
B BURGDOR41KD IGG SER QL IB: PRESENT
B BURGDOR41KD IGM SER QL IB: NORMAL
B BURGDOR45KD IGG SER QL IB: NORMAL
B BURGDOR58KD IGG SER QL IB: PRESENT
B BURGDOR66KD IGG SER QL IB: NORMAL
B BURGDOR93KD IGG SER QL IB: NORMAL
GQ1B AB IGG: NORMAL TITER
HU AB SER QL: NORMAL
MAG AB SER QL: NEGATIVE
PURKINJE CELLS AB SER QL IF: NORMAL
VIT B6 SERPL-MCNC: 18.3 UG/L

## 2021-06-29 LAB
SARS-COV-2 N GENE NPH QL NAA+PROBE: NOT DETECTED
SULFATIDE AB SER QL: NORMAL

## 2021-07-01 ENCOUNTER — APPOINTMENT (OUTPATIENT)
Dept: NEUROLOGY | Facility: CLINIC | Age: 86
End: 2021-07-01
Payer: MEDICARE

## 2021-07-01 DIAGNOSIS — R20.0 ANESTHESIA OF SKIN: ICD-10-CM

## 2021-07-01 PROCEDURE — 95910 NRV CNDJ TEST 7-8 STUDIES: CPT

## 2021-07-01 PROCEDURE — 99212 OFFICE O/P EST SF 10 MIN: CPT | Mod: 25

## 2021-07-01 PROCEDURE — 95886 MUSC TEST DONE W/N TEST COMP: CPT

## 2021-07-01 NOTE — PROCEDURE
[FreeTextEntry1] :   \par [facility]\par [facility]\par Neurology and Electrophysiology\par \par Nerve Conduction & EMG Report\par \par \par   \par Full Name:	Carolyn Duffy	Gender:	Female\par MRN:	[id number]	YOB: 1935\par   \par Visit Date:	7/1/2021 10:20\par Age:	85 Years\par Examining Physician:	Chris Weston MD\par Height:	5 feet 8 inch\par Weight:	140 lbs\par   \par ________________________________________\par \par Conclusion: \par \par Sensory nerve conduction\par Right radial sensory nerve action potential had normal latency, normal amplitude and normal conduction velocity.\par \par Left sural sensory nerve action potential had normal latency, decreased amplitude and normal conduction velocity.\par \par Right sural sensory nerve action potential had normal latency, decreased amplitude and normal conduction velocity.\par \par F wave latency\par The F-wave latency of the right peroneal had no response.  The right tibial F-wave latency was within normal limits.  The left peroneal and tibial F wave latencies were prolonged\par \par Motor Nerve Conduction Studies\par Right peroneal nerve compound motor action potential had normal latency, decreased amplitude and normal conduction velocity.\par \par Left peroneal nerve compound motor action potential had normal latency, normal amplitude and normal conduction velocity.\par \par Right tibial nerve compound motor action potential had normal latency, normal amplitude and decreased conduction velocity.\par \par Left tibial nerve compound motor action potential had normal latency, decreased amplitude and normal conduction velocity.\par \par Needle EMG was performed using a disposable concentric needle in the listed muscles:\par tibialis anterior, medial gastroc, long head of biceps femoris, vastus lateralis, gluteus medius muscle had no spontaneous activity and normal motor units. 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  Given the scattered distribution of findings will check labs for vasculitis and hemoglobin A1c.  Patient will follow up with me in 3 months.\par Clinical and radiologic correlation is advised. \par \par Thank you for the consult,\par Chris Weston MD\par Diplomat, American Board of Neurology and Psychiatry\par \par ________________________________________\par  \par    \par \par   \par Sensory NCS\par   \par Nerve / Sites	Rec. Site	Onset Lat	Peak Lat	NP Amp	PP Amp	Segments	Distance	Velocity\par 		ms	ms	µV	µV		cm	m/s\par R Radial - Superficial (Antidromic)\par    Forearm	Wrist	1.21	1.71	44.2	29.0	Forearm - Wrist	7	58\par L Sural - Orthodromic, Calf (Ankle)\par    Ankle	Calf	2.42	2.94	4.1	11.4	Ankle - Calf	13	54\par R Sural - Orthodromic, Calf (Ankle)\par    Ankle	Calf	2.85	3.40	3.2	5.6	Ankle - Calf	12	42\par   \par Motor NCS\par   \par Nerve / Sites	Muscle	Latency	Amplitude	Rel Amp	Segments	Distance	Lat Diff	Velocity	Durat\par 		ms	mV	%		cm	ms	m/s	ms\par R Peroneal - EDB\par    Ankle	EDB	4.73	1.7	100	Ankle - EDB	9			5.08\par    B. Fib Head	EDB	12.06	1.6	91.3	B. Fib Head - Ankle	37	7.33	50	5.81\par    A. Fib Head	EDB	13.98	2.4	154	A. Fib Head - B. Fib Head	10	1.92	52	5.90\par L Peroneal - EDB\par    Ankle	EDB	4.73	3.6	100	Ankle - EDB	8			4.52\par    B. Fib Head	EDB	11.96	3.1	86	B. Fib Head - Ankle	33	7.23	46	5.46\par    A. Fib Head	EDB	14.21	3.9	125	A. Fib Head - B. Fib Head	10	2.25	44	5.85\par R Tibial - AH\par    Ankle	AH	3.15	3.3	100	Ankle - AH	7			5.81\par    Knee	AH	11.69	2.6	80.8	Knee - Ankle	32	8.54	37	7.90\par    Ankle	ADQP	3.42	3.3	125	Ankle - ADQP				5.69\par L Tibial - AH\par    Ankle	AH	3.83	2.4	100	Ankle - AH	4			4.92\par    Knee	AH	12.10	2.5	105	Knee - Ankle	35	8.27	42	7.69\par   \par F  Wave\par   \par Nerve	F min\par 	ms\par R Peroneal - EDB	\par R Tibial - AH	46.5\par L Peroneal - EDB	52.7\par L Tibial - AH	57.8\par   \par EMG Summary Table	\par 	Spontaneous	MUAP	Recruitment\par Muscle	Nerve	Roots	IA	Fib	PSW	Fasc	Comments	Amp	Dur.	PPP	Pattern\par R. Tibialis anterior	Deep peroneal (Fibular)	L4-L5	N	None	None	None	None	N	N	N	N\par R. Gastrocnemius (Medial head)	Tibial	S1-S2	N	None	None	None	None	N	N	N	N\par R. Biceps femoris (long head)	Sciatic (tibial division)	L5-S2	N	None	None	None	None	N	N	N	N\par R. Vastus lateralis	Femoral	L2-L4	N	None	None	None	None	N	N	N	N\par R. Gluteus medius	Superior gluteal	L4-S1	N	None	None	None	None	N	N	N	N\par                                  \par  \par

## 2021-07-01 NOTE — PHYSICAL EXAM
[Person] : oriented to person [Place] : oriented to place [Time] : oriented to time [Short Term Intact] : short term memory intact [Fluency] : fluency intact [Current Events] : adequate knowledge of current events [Cranial Nerves Oculomotor (III)] : extraocular motion intact [Cranial Nerves Facial (VII)] : face symmetrical [Cranial Nerves Accessory (XI - Cranial And Spinal)] : head turning and shoulder shrug symmetric [Sensation Vibration Decrease] : vibration was intact [Romberg's Sign] : Romberg's sign was negtive [Proprioception] : proprioception was intact [Dysesthesia] : no dysesthesia [Hyperesthesia] : no hyperesthesia [Abnormal Walk] : normal gait [FreeTextEntry5] : Slight hearing impairment [FreeTextEntry7] : Decreased to light touch and pinprick to the ankle on the right and to right below the knee on the left.

## 2021-07-01 NOTE — DISCUSSION/SUMMARY
[FreeTextEntry1] : 85-year-old woman who is here for follow-up and EMG testing of her sensory symptoms in her legs.  While I think there is a component of exacerbation with left foot tendinopathy and arthritis will check for further causes of asymmetric neuropathy given her nerve conduction studies.  Patient will have labs for vasculitis and hemoglobin A1c.  Patient will follow up with me in 3 months\par I spent the time noted on the day of this patient encounter preparing for, providing and documenting the above E/M service and counseling and educate patient on differential, workup, disease course, and treatment/management. Education was provided to the patient during this encounter. All questions and concerns were answered and addressed in detail. The patient verbalized understanding and agreed to plan. Patient was advised to continue to monitor for neurologic symptoms and to notify my office or go to the nearest emergency room if there are any changes. Any orders/referrals and communications were provided as well. \par Side effects of the above medications were discussed in detail including but not limited to applicable black box warning and teratogenicity as appropriate. \par Patient was advised to bring previous records to my office. \par \par \par

## 2021-07-01 NOTE — HISTORY OF PRESENT ILLNESS
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  Patient woke up 1 morning and had left foot pain that started on the top of her feet and radiated to the arch of her foot.  Patient states that the pain was so severe that she was unable to walk.  Eventually the symptom occurred on the right foot as well and she went to the urgent care on March 9.  There was no fracture that was noted and she saw Dr. Gutierrez a foot orthopedic doctor.  Patient had MRI of the foot which showed tendinopathy and arthritis.  Patient was started on gabapentin 200 mg at night by her PMD Dr. Pierson.  Patient states that the symptoms are somewhat better.  Patient continues to experience toe numbness and occasionally tingling whenever she is seated.  The gabapentin helped with the foot cramps and she states that it is feeling better however she still has trouble walking.  Patient started alpha lipoic acid and B12 supplementation when it was recommended by her friend who was a physician.  Patient was advised that if she does not have any known history of vitamin B 12 deficiency she should not take the supplement.\par \par Of note patient was noted to have carotid stenosis around 40% in her left carotid. She was supposed to be on aspirin 81 mg however it upsets her stomach and she has stopped.  Patient was advised that the medical management is continuation of aspirin 81 mg and perhaps she should see her PMD regarding any treatment for her GERD.  This is to decrease her risk of infarct.\par \par Interval history: Patient states that the gabapentin is helping her pain in her feet.  Patient tried a drug holiday and the symptoms return.

## 2021-07-02 LAB
CRP SERPL-MCNC: <3 MG/L
ERYTHROCYTE [SEDIMENTATION RATE] IN BLOOD BY WESTERGREN METHOD: 16 MM/HR

## 2021-07-06 ENCOUNTER — NON-APPOINTMENT (OUTPATIENT)
Age: 86
End: 2021-07-06

## 2021-07-06 LAB
ALBUPE: 25.3 %
ALPHA1UPE: 28.3 %
ALPHA2UPE: 21.4 %
ANA PAT FLD IF-IMP: ABNORMAL
ANA SER IF-ACNC: ABNORMAL
ANCA AB SER-IMP: ABNORMAL
BENCE JONES EXCRETION: 0 MG/24HR
BETAUPE: 13.1 %
C-ANCA SER-ACNC: NEGATIVE
CREAT 24H UR-MCNC: 0.6 G/24 H
CREATININE UR (MAYO): 38 MG/DL
ENA RNP AB SER IA-ACNC: 0.7 AL
ENA SM AB SER IA-ACNC: <0.2 AL
GAMMAUPE: 11.9 %
IGA 24H UR QL IFE: NORMAL
KAPPA LC 24H UR QL: 0 MG/DL
M PROTEIN 24H MFR UR ELPH: 0 %
P-ANCA TITR SER IF: NEGATIVE
PROT ?TM UR-MCNC: 24 HR
PROT PATTERN 24H UR ELPH-IMP: NORMAL
PROT UR-MCNC: 6 MG/DL
PROT UR-MCNC: 6 MG/DL
PS IGA SER QL: 1
PS IGG SER QL: 2
PS IGM SER QL: 4
SPECIMEN VOL 24H UR: 1450 ML
U PROTEIN QNT CALCULATION: 87 MG/24 H

## 2021-10-06 ENCOUNTER — APPOINTMENT (OUTPATIENT)
Dept: NEUROLOGY | Facility: CLINIC | Age: 86
End: 2021-10-06

## 2022-02-23 ENCOUNTER — APPOINTMENT (OUTPATIENT)
Dept: SPINE | Facility: CLINIC | Age: 87
End: 2022-02-23
Payer: MEDICARE

## 2022-02-23 ENCOUNTER — NON-APPOINTMENT (OUTPATIENT)
Age: 87
End: 2022-02-23

## 2022-02-23 VITALS
TEMPERATURE: 98.3 F | SYSTOLIC BLOOD PRESSURE: 148 MMHG | OXYGEN SATURATION: 96 % | HEART RATE: 59 BPM | RESPIRATION RATE: 17 BRPM | BODY MASS INDEX: 21.22 KG/M2 | DIASTOLIC BLOOD PRESSURE: 76 MMHG | HEIGHT: 68 IN | WEIGHT: 140 LBS

## 2022-02-23 DIAGNOSIS — M48.02 SPINAL STENOSIS, CERVICAL REGION: ICD-10-CM

## 2022-02-23 DIAGNOSIS — M54.2 CERVICALGIA: ICD-10-CM

## 2022-02-23 PROCEDURE — 99204 OFFICE O/P NEW MOD 45 MIN: CPT

## 2022-02-24 PROBLEM — M54.2 NECK PAIN: Status: ACTIVE | Noted: 2022-02-23

## 2022-02-24 PROBLEM — M48.02 FORAMINAL STENOSIS OF CERVICAL REGION: Status: ACTIVE | Noted: 2022-02-23

## 2022-05-02 NOTE — OCCUPATIONAL THERAPY INITIAL EVALUATION ADULT - NAME OF CLINICIAN
RN phoned patient   Message states patient is not receiving calls at this time  Will try at another time   YAMILETH (Joey Cantrell)

## 2022-11-14 ENCOUNTER — APPOINTMENT (OUTPATIENT)
Dept: SPINE | Facility: CLINIC | Age: 87
End: 2022-11-14

## 2022-11-14 ENCOUNTER — NON-APPOINTMENT (OUTPATIENT)
Age: 87
End: 2022-11-14

## 2022-11-14 VITALS
BODY MASS INDEX: 23.03 KG/M2 | SYSTOLIC BLOOD PRESSURE: 173 MMHG | DIASTOLIC BLOOD PRESSURE: 75 MMHG | HEIGHT: 66.5 IN | HEART RATE: 56 BPM | OXYGEN SATURATION: 95 % | WEIGHT: 145 LBS

## 2022-11-14 PROCEDURE — 99214 OFFICE O/P EST MOD 30 MIN: CPT

## 2022-11-14 RX ORDER — AMLODIPINE BESYLATE 5 MG/1
TABLET ORAL
Refills: 0 | Status: DISCONTINUED | COMMUNITY
End: 2022-11-14

## 2022-12-22 ENCOUNTER — APPOINTMENT (OUTPATIENT)
Dept: PAIN MANAGEMENT | Facility: CLINIC | Age: 87
End: 2022-12-22
Payer: MEDICARE

## 2022-12-22 VITALS — BODY MASS INDEX: 23.46 KG/M2 | WEIGHT: 146 LBS | HEIGHT: 66 IN

## 2022-12-22 DIAGNOSIS — M47.812 SPONDYLOSIS W/OUT MYELOPATHY OR RADICULOPATHY, CERVICAL REGION: ICD-10-CM

## 2022-12-22 PROCEDURE — 99214 OFFICE O/P EST MOD 30 MIN: CPT

## 2022-12-22 NOTE — HISTORY OF PRESENT ILLNESS
[Neck] : neck [5] : 5 [Dull/Aching] : dull/aching [Intermittent] : intermittent [Household chores] : household chores [Nothing helps with pain getting better] : Nothing helps with pain getting better [Sitting] : sitting [Standing] : standing [Walking] : walking [FreeTextEntry1] : Initial HPI  [] : This patient has had an injection before: no [FreeTextEntry7] : left shoulder  [de-identified] : c mri

## 2022-12-22 NOTE — PHYSICAL EXAM
[de-identified] : PHYSICAL EXAM\par \par Constitutional: \par Appears well, no apparent distress\par Ability to communicate: Normal\par Respiratory: non-labored breathing\par Skin: no rash noted\par Head: normocephalic, atraumatic\par Neck: no visible thyroid enlargement\par Eyes: extraocular movements intact\par Neurologic: alert and oriented x3\par Psychiatric: normal mood, affect, and behavior\par \par \par Neck: Palpation of the cervical spine is as follows: left trapezial spasm,  left trapezial tenderness, left paracervical spasm, left paracervical tenderness and right paracervical tenderness. Range of motion of the cervical spine is as follows: diminished range of motion in all planes Pain at extremes of rotation to left. Stiffness at extremes of rotation to left. Strength Testing for the cervical spine is as follows: Left Deltoid strength 5/5, Right Deltoid strength 5/5, Left Biceps 5/5, Right Biceps 5/5, Left Triceps 5/5, Right Triceps 5/5, Left Wrist Flexors 5/5, Right Wrist Flexors 5/5, Left Finger Abductors 5/5, Right Finger Abductors 5/5, Left Grasp 5/5 and Right Grasp 5/5 Neurological testing for the cervical spine is as follows: positive bilateral facet loading. light touch is intact throughout both upper extremities, normal deep tendon reflexes bilateral upper extremities, negative Spurling test and negative Bustamante reflex \par \par \par Assessment\par \par Spondylosis of cervical region (M47.812)\par Cervicalgia (M54.2)\par \par \par Plan:\par After discussing various treatment options with the patient including but not limited to oral medications, physical therapy, exercise modalities as well as interventional spinal injections, we have decided with the following plan:\par \par MRI Review \par I personally reviewed the MRI/CT scan images and agree with the radiologist's report.  The radiological findings were discussed with the patient. \par  \par The risks, benefits and alternatives of the proposed procedure were explained in detail with the patient.  The risks outlined include but are not limited to infection, bleeding, post dural puncture headache, nerve injury, a temporary increase in pain, failure to resolve symptoms, allergic reaction, symptom recurrence, and possible elevation of blood sugar.  All questions were answered to patient's satisfaction and he/she verbalized an understanding.\par \par Follow up 1-2 weeks post injection for re-evaluation.\par \par Continue home exercises, stretching, activity modification, physical therapy, and conservative care.\par \par

## 2023-02-07 ENCOUNTER — APPOINTMENT (OUTPATIENT)
Age: 88
End: 2023-02-07

## 2023-02-23 ENCOUNTER — APPOINTMENT (OUTPATIENT)
Dept: PAIN MANAGEMENT | Facility: CLINIC | Age: 88
End: 2023-02-23

## 2023-05-13 ENCOUNTER — FORM ENCOUNTER (OUTPATIENT)
Age: 88
End: 2023-05-13

## 2023-05-14 ENCOUNTER — APPOINTMENT (OUTPATIENT)
Dept: MRI IMAGING | Facility: CLINIC | Age: 88
End: 2023-05-14
Payer: MEDICARE

## 2023-05-14 ENCOUNTER — APPOINTMENT (OUTPATIENT)
Dept: ORTHOPEDIC SURGERY | Facility: CLINIC | Age: 88
End: 2023-05-14
Payer: MEDICARE

## 2023-05-14 VITALS — HEIGHT: 66 IN | BODY MASS INDEX: 24.11 KG/M2 | WEIGHT: 150 LBS

## 2023-05-14 PROCEDURE — 99213 OFFICE O/P EST LOW 20 MIN: CPT | Mod: 25

## 2023-05-14 PROCEDURE — 29075 APPL CST ELBW FNGR SHORT ARM: CPT | Mod: RT

## 2023-05-14 PROCEDURE — 99203 OFFICE O/P NEW LOW 30 MIN: CPT | Mod: 25

## 2023-05-14 PROCEDURE — 73110 X-RAY EXAM OF WRIST: CPT | Mod: RT

## 2023-05-14 PROCEDURE — 73030 X-RAY EXAM OF SHOULDER: CPT | Mod: RT

## 2023-05-14 PROCEDURE — 73221 MRI JOINT UPR EXTREM W/O DYE: CPT | Mod: RT,MH

## 2023-05-14 NOTE — ASSESSMENT
[FreeTextEntry1] : The patient was advised of the diagnosis. The natural history of the pathology was explained in full to the patient in layman's terms. All questions were answered. The risks and benefits of surgical and non-surgical treatment alternatives were explained in full to the patient.\par A cast was applied.  The importance of ice and elevation were discussed with the patient.  The risks were also discussed such as compartment syndrome and skin breakdown.  They were instructed to never put foreign objects down the cast.  Patients should call for increasing pain, worsening swelling, numbness, extremity discoloration, or any other concerns.\par We reviewed the importance of finger range of motion.  We discussed that while wrist stiffness can be tolerated, finger stiffness causes grave dysfunction and is difficult to overcome once it sets in.  The patient was encouraged to engage in finger range of motion exercises.  A home exercise program was demonstrated and instructions given to begin immediately and to perform the exercises hourly.\par \par Pt provided right SAC x 6 weeks. \par Referred for stat MRI of the right wrist to assess for occult scaphoid fracture.\par RTO in 1 week to discuss MRI results/tx options.\par Family informed that if there is a scaphoid fracture noted on MRI she will have an option of spica cast x 3 mos vs surgical intervention.

## 2023-05-14 NOTE — IMAGING
[de-identified] : RIGHT shoulder with no skin changes or bony deformity.\par ROM is midly limited in flexion\par There is no significant TTP\par Ceballos Buddy Impingement Sign mildly positive\par Neer Impingement Sign mildly positive\par Posterior Lift Off Test \par Speed Sign  mildly positive\par Sulcus Sign negative\par Strength Testing shows mildly limited in abduction and with Posterior Lift off\par \par Cervical Exam shows : minimally limited lateral/rotational motion\par Spurling Signs are negative symmetrically\par \par Wrist strength: not assessed\par  and intrinsic strength: 4/5 due to discomfort\par ttp over the distal radius and mildly to the anatomic snuffbox.\par All digits are NVI and with FAROM (intact flexor and extensor tendon function).\par  [Right] : right wrist [FreeTextEntry8] : right distal radius fracture non displaced (Gerdy's tubercle). No evidence of scaphoid fx.

## 2023-05-14 NOTE — HISTORY OF PRESENT ILLNESS
[7] : 7 [4] : 4 [Localized] : localized [Sharp] : sharp [Tightness] : tightness [Constant] : constant [Meds] : meds [de-identified] : 5/14/23:  RHD 86 yo pt is an 86 y/o F presents with right wrist and shoulder pain; onset of pain on 5/13/23: pt was walking and fell onto the shoulder and wrist; pt states that the pain is separate; pain is localized in the shoulder and the wrist; pt has been taking tylenol and ibuprofen for the pain with some relief felt; pt has her wrist wrapped in an ace bandage\par \par PMH: Codeine, Demerol , Penicillins. [] : no [FreeTextEntry9] : ibprofen and tylenol

## 2023-05-16 ENCOUNTER — APPOINTMENT (OUTPATIENT)
Dept: ORTHOPEDIC SURGERY | Facility: CLINIC | Age: 88
End: 2023-05-16
Payer: MEDICARE

## 2023-05-16 VITALS — WEIGHT: 150 LBS | BODY MASS INDEX: 24.11 KG/M2 | HEIGHT: 66 IN

## 2023-05-16 PROCEDURE — 99214 OFFICE O/P EST MOD 30 MIN: CPT | Mod: 57

## 2023-05-16 PROCEDURE — 25600 CLTX DST RDL FX/EPHYS SEP WO: CPT | Mod: RT

## 2023-05-16 NOTE — ASSESSMENT
[FreeTextEntry1] : The patient was advised of the diagnosis. The natural history of the pathology was explained in full to the patient in layman's terms. All questions were answered. The risks and benefits of surgical and non-surgical treatment alternatives were explained in full to the patient.\par \par F/u in 6 weeks for xray out of cast

## 2023-05-16 NOTE — HISTORY OF PRESENT ILLNESS
[3] : 3 [0] : 0 [Dull/Aching] : dull/aching [de-identified] : 5/16/23:  Pt fell walking to the bathroom in the middle of the night and hurt her right wrist on 5/13/23.  Pt was seen by ROBY Iniguez in  and was put in a SAC.  Pt is comfortable in cast.  Pt is also having right shoulder pain.\par \par MRI right wrist:\par 1. Severe multifocal marrow edema which is nonspecific but could indicate multiple fractures or stress reaction \par including the radius, severe ligament tearing, effusion, soft tissue swelling, muscle strains and there may be \par possible erosions in the ulnar styloid.\par 2. Clinical correlation regarding possible acute trauma superimposed on severe chronic inflammatory \par arthropathy is recommended. Correlation with plain film is recommended. Consider CT scan of the right wrist \par to further evaluate as clinically indicated. [FreeTextEntry1] : right shoulder, right wrist

## 2023-05-16 NOTE — DATA REVIEWED
[Wrist] : wrist [Outside X-rays] : outside x-rays [Right] : of the right [Shoulder] : shoulder [I independently reviewed and interpreted images and report] : I independently reviewed and interpreted images and report [FreeTextEntry1] : minimally displaced distal radius fracture [FreeTextEntry2] : no fractures/dislocations

## 2023-05-25 RX ORDER — CLINDAMYCIN HYDROCHLORIDE 150 MG/1
150 CAPSULE ORAL
Qty: 4 | Refills: 0 | Status: ACTIVE | COMMUNITY
Start: 2023-05-25 | End: 1900-01-01

## 2023-05-30 ENCOUNTER — APPOINTMENT (OUTPATIENT)
Dept: ORTHOPEDIC SURGERY | Facility: CLINIC | Age: 88
End: 2023-05-30
Payer: MEDICARE

## 2023-05-30 ENCOUNTER — FORM ENCOUNTER (OUTPATIENT)
Age: 88
End: 2023-05-30

## 2023-05-30 VITALS — WEIGHT: 150 LBS | HEIGHT: 66 IN | BODY MASS INDEX: 24.11 KG/M2

## 2023-05-30 PROCEDURE — 99024 POSTOP FOLLOW-UP VISIT: CPT

## 2023-05-30 PROCEDURE — L3908: CPT

## 2023-05-30 PROCEDURE — 73110 X-RAY EXAM OF WRIST: CPT | Mod: RT

## 2023-05-30 NOTE — HISTORY OF PRESENT ILLNESS
[8] : 8 [5] : 5 [Dull/Aching] : dull/aching [de-identified] : 5/30/23:  Pt reports that she has not had pain in cast until she woke up this morning with "severe" ulnar sided wrist pain.  The pain has since improved.\par \par 5/16/23:  Pt fell walking to the bathroom in the middle of the night and hurt her right wrist on 5/13/23.  Pt was seen by ROBY Iniguez in  and was put in a SAC.  Pt is comfortable in cast.  Pt is also having right shoulder pain.\par \par MRI right wrist:\par 1. Severe multifocal marrow edema which is nonspecific but could indicate multiple fractures or stress reaction \par including the radius, severe ligament tearing, effusion, soft tissue swelling, muscle strains and there may be \par possible erosions in the ulnar styloid.\par 2. Clinical correlation regarding possible acute trauma superimposed on severe chronic inflammatory \par arthropathy is recommended. Correlation with plain film is recommended. Consider CT scan of the right wrist \par to further evaluate as clinically indicated. [FreeTextEntry1] : right wrist  [FreeTextEntry5] : fx of right wrist  [de-identified] : movement  [de-identified] : MRI

## 2023-05-30 NOTE — IMAGING
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

## 2023-05-30 NOTE — ASSESSMENT
[FreeTextEntry1] : The patient was advised of the diagnosis. The natural history of the pathology was explained in full to the patient in layman's terms. All questions were answered. The risks and benefits of surgical and non-surgical treatment alternatives were explained in full to the patient.\par \par CT right wrist to eval for degenerative change\par F/u after test\par Pt will wear wrist brace

## 2023-05-31 ENCOUNTER — APPOINTMENT (OUTPATIENT)
Dept: CT IMAGING | Facility: CLINIC | Age: 88
End: 2023-05-31
Payer: MEDICARE

## 2023-05-31 PROCEDURE — 76376 3D RENDER W/INTRP POSTPROCES: CPT | Mod: RT

## 2023-05-31 PROCEDURE — 73200 CT UPPER EXTREMITY W/O DYE: CPT | Mod: RT,MH

## 2023-06-01 ENCOUNTER — APPOINTMENT (OUTPATIENT)
Dept: ORTHOPEDIC SURGERY | Facility: CLINIC | Age: 88
End: 2023-06-01
Payer: MEDICARE

## 2023-06-01 VITALS — BODY MASS INDEX: 24.11 KG/M2 | HEIGHT: 66 IN | WEIGHT: 150 LBS

## 2023-06-01 DIAGNOSIS — S52.501A UNSPECIFIED FRACTURE OF THE LOWER END OF RIGHT RADIUS, INITIAL ENCOUNTER FOR CLOSED FRACTURE: ICD-10-CM

## 2023-06-01 DIAGNOSIS — M19.031 PRIMARY OSTEOARTHRITIS, RIGHT WRIST: ICD-10-CM

## 2023-06-01 DIAGNOSIS — S69.81XA OTHER SPECIFIED INJURIES OF RIGHT WRIST, HAND AND FINGER(S), INITIAL ENCOUNTER: ICD-10-CM

## 2023-06-01 PROCEDURE — 99024 POSTOP FOLLOW-UP VISIT: CPT

## 2023-06-01 NOTE — ASSESSMENT
[FreeTextEntry1] : The patient was advised of the diagnosis. The natural history of the pathology was explained in full to the patient in layman's terms. All questions were answered. The risks and benefits of surgical and non-surgical treatment alternatives were explained in full to the patient.\par \par C/w brace\par Start OT for finger rom\par F/u in 4 weeks

## 2023-06-01 NOTE — DATA REVIEWED
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

## 2023-06-01 NOTE — IMAGING
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

## 2023-06-28 ENCOUNTER — APPOINTMENT (OUTPATIENT)
Dept: ORTHOPEDIC SURGERY | Facility: CLINIC | Age: 88
End: 2023-06-28

## 2024-04-09 NOTE — OCCUPATIONAL THERAPY INITIAL EVALUATION ADULT - PRECAUTIONS/LIMITATIONS, REHAB EVAL
For information on Fall & Injury Prevention, visit: [email or web address] OR  [email or web address] OR  https://www.cdc.gov/steadi/patient.html
fall precautions

## 2024-09-03 NOTE — HISTORY OF PRESENT ILLNESS
[de-identified] : 6/1/23:  Pt is here s/p CT.  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  The pain has since improved.\par \par 5/16/23:  Pt fell walking to the bathroom in the middle of the night and hurt her right wrist on 5/13/23.  Pt was seen by ROBY Iniguez in  and was put in a SAC.  Pt is comfortable in cast.  Pt is also having right shoulder pain.\par \par MRI right wrist:\par 1. Severe multifocal marrow edema which is nonspecific but could indicate multiple fractures or stress reaction \par including the radius, severe ligament tearing, effusion, soft tissue swelling, muscle strains and there may be \par possible erosions in the ulnar styloid.\par 2. Clinical correlation regarding possible acute trauma superimposed on severe chronic inflammatory \par arthropathy is recommended. Correlation with plain film is recommended. Consider CT scan of the right wrist \par to further evaluate as clinically indicated.
No

## 2025-06-11 NOTE — H&P PST ADULT - LAB RESULTS AND INTERPRETATION
--HR driven likely from postpregnancy state and some component of anxiety, would titrate down Procardia to 30 mg tomorrow. Change PEC regimen to include Labetalol 300 mg TID.   --No evidence of HF on exam 10/16/2019  Pt and Ortho PA made aware of MRSA/MSSA results. Rx was e-prescribed. ([facility])